# Patient Record
Sex: FEMALE | Race: WHITE | NOT HISPANIC OR LATINO | URBAN - METROPOLITAN AREA
[De-identification: names, ages, dates, MRNs, and addresses within clinical notes are randomized per-mention and may not be internally consistent; named-entity substitution may affect disease eponyms.]

---

## 2018-01-03 PROBLEM — 34801009 ECTOPIC PREGNANCY: Status: ACTIVE | Noted: 2018-01-03

## 2018-01-03 PROBLEM — 161701005 HISTORY OF RESPIRATOR DEPENDENCE: Status: ACTIVE | Noted: 2018-01-03

## 2018-01-03 PROBLEM — 38341003 HYPERTENSION: Status: ACTIVE | Noted: 2018-01-03

## 2018-01-03 PROBLEM — 70153002 HEMORRHOIDS: Status: ACTIVE | Noted: 2018-01-03

## 2018-01-03 PROBLEM — 3723001 ARTHRITIS: Status: ACTIVE | Noted: 2018-01-03

## 2019-11-12 PROBLEM — 4556007 GASTRITIS: Status: ACTIVE | Noted: 2019-11-12

## 2020-05-26 PROBLEM — 78275009 OBSTRUCTIVE SLEEP APNEA: Status: ACTIVE | Noted: 2020-05-26

## 2020-05-27 PROBLEM — 13644009 HYPERCHOLESTEROLEMIA: Status: ACTIVE | Noted: 2020-05-27

## 2020-05-27 PROBLEM — 203082005 FIBROMYALGIA: Status: ACTIVE | Noted: 2020-05-27

## 2020-05-27 PROBLEM — 428283002 HISTORY OF POLYP OF COLON: Status: ACTIVE | Noted: 2020-05-27

## 2020-05-27 PROBLEM — 197480006 ANXIETY DISORDER: Status: ACTIVE | Noted: 2020-05-27

## 2020-06-02 ENCOUNTER — OFFICE VISIT (OUTPATIENT)
Dept: URBAN - METROPOLITAN AREA CLINIC 13 | Facility: CLINIC | Age: 60
End: 2020-06-02

## 2020-06-24 ENCOUNTER — OFFICE VISIT (OUTPATIENT)
Dept: URBAN - METROPOLITAN AREA CLINIC 46 | Facility: CLINIC | Age: 60
End: 2020-06-24

## 2021-08-25 ENCOUNTER — OFFICE VISIT (OUTPATIENT)
Dept: URBAN - METROPOLITAN AREA CLINIC 46 | Facility: CLINIC | Age: 61
End: 2021-08-25

## 2021-08-27 ENCOUNTER — OFFICE VISIT (OUTPATIENT)
Dept: URBAN - METROPOLITAN AREA CLINIC 13 | Facility: CLINIC | Age: 61
End: 2021-08-27

## 2021-08-28 ENCOUNTER — TELEPHONE ENCOUNTER (OUTPATIENT)
Dept: URBAN - METROPOLITAN AREA CLINIC 13 | Facility: CLINIC | Age: 61
End: 2021-08-28

## 2021-08-28 RX ORDER — NALOXEGOL OXALATE 25 MG/1
TABLET, FILM COATED ORAL
OUTPATIENT
Start: 2019-11-12 | End: 2020-05-27

## 2021-08-28 RX ORDER — PSEUDOEPHEDRINE HYDROCHLORIDE 30 MG/1
TABLET, COATED ORAL
OUTPATIENT
End: 2019-06-18

## 2021-08-28 RX ORDER — BISOPROLOL FUMARATE 5 MG/1
TABLET, FILM COATED ORAL
OUTPATIENT
End: 2019-06-18

## 2021-08-28 RX ORDER — DEXLANSOPRAZOLE 60 MG/1
CAPSULE, DELAYED RELEASE ORAL
OUTPATIENT
Start: 2019-11-12 | End: 2020-05-27

## 2021-08-28 RX ORDER — VARENICLINE TARTRATE 1 MG/1
TABLET, FILM COATED ORAL
OUTPATIENT
End: 2019-06-18

## 2021-08-28 RX ORDER — DICYCLOMINE HYDROCHLORIDE 10 MG/1
CAPSULE ORAL
OUTPATIENT
Start: 2017-12-29 | End: 2019-03-11

## 2021-08-28 RX ORDER — ELETRIPTAN HYDROBROMIDE 40 MG/1
TABLET, FILM COATED ORAL
OUTPATIENT
End: 2019-11-12

## 2021-08-28 RX ORDER — HYOSCYAMINE SULFATE 0.12 MG/1
TABLET SUBLINGUAL
OUTPATIENT
Start: 2019-06-18 | End: 2019-09-10

## 2021-08-28 RX ORDER — ESOMEPRAZOLE MAGNESIUM 40 MG/1
GRANULE, DELAYED RELEASE ORAL
OUTPATIENT
End: 2020-05-27

## 2021-08-28 RX ORDER — CIPROFLOXACIN HYDROCHLORIDE 500 MG/1
TABLET, FILM COATED ORAL
OUTPATIENT
Start: 2017-12-29 | End: 2019-03-11

## 2021-08-28 RX ORDER — LINACLOTIDE 145 UG/1
CAPSULE, GELATIN COATED ORAL
OUTPATIENT
Start: 2017-10-31 | End: 2019-06-18

## 2021-08-28 RX ORDER — LINACLOTIDE 72 UG/1
CAPSULE, GELATIN COATED ORAL
OUTPATIENT
Start: 2019-03-11 | End: 2019-06-18

## 2021-08-28 RX ORDER — METRONIDAZOLE 500 MG/1
TABLET ORAL
OUTPATIENT
Start: 2017-12-29 | End: 2019-03-11

## 2021-08-28 RX ORDER — CYCLOSPORINE 0.5 MG/ML
EMULSION OPHTHALMIC
OUTPATIENT
End: 2019-09-10

## 2021-08-28 RX ORDER — SUCRALFATE 1 G/1
TABLET ORAL
OUTPATIENT
Start: 2019-06-18 | End: 2019-09-10

## 2021-08-29 ENCOUNTER — TELEPHONE ENCOUNTER (OUTPATIENT)
Dept: URBAN - METROPOLITAN AREA CLINIC 13 | Facility: CLINIC | Age: 61
End: 2021-08-29

## 2021-08-29 RX ORDER — ESOMEPRAZOLE MAGNESIUM 40 MG/1
FOR SUSPENSION ORAL
Status: ACTIVE | COMMUNITY
Start: 2020-03-23

## 2021-08-29 RX ORDER — RIZATRIPTAN BENZOATE 10 MG/1
TABLET ORAL
Status: ACTIVE | COMMUNITY

## 2021-08-29 RX ORDER — CYCLOBENZAPRINE HYDROCHLORIDE 10 MG/1
TABLET, FILM COATED ORAL
Status: ACTIVE | COMMUNITY

## 2021-08-29 RX ORDER — HYDROCODONE BITARTRATE AND ACETAMINOPHEN 5; 325 MG/1; MG/1
TABLET ORAL
Status: ACTIVE | COMMUNITY

## 2021-08-29 RX ORDER — ATENOLOL 25 MG/1
TABLET ORAL
Status: ACTIVE | COMMUNITY

## 2021-08-29 RX ORDER — BUPROPION HYDROCHLORIDE 300 MG/1
TABLET, EXTENDED RELEASE ORAL
Status: ACTIVE | COMMUNITY

## 2021-08-29 RX ORDER — TRAZODONE HYDROCHLORIDE 100 MG/1
TABLET ORAL
Status: ACTIVE | COMMUNITY

## 2021-08-29 RX ORDER — ADHESIVE TAPE 3"X 2.3 YD
TAPE, NON-MEDICATED TOPICAL
Status: ACTIVE | COMMUNITY

## 2021-08-29 RX ORDER — PANCRELIPASE LIPASE, PANCRELIPASE PROTEASE, PANCRELIPASE AMYLASE 40000; 126000; 168000 [USP'U]/1; [USP'U]/1; [USP'U]/1
CAPSULE, DELAYED RELEASE ORAL
Status: ACTIVE | COMMUNITY
Start: 2019-11-22

## 2021-08-29 RX ORDER — FLUTICASONE PROPIONATE 50 UG/1
SPRAY, METERED NASAL
Status: ACTIVE | COMMUNITY

## 2021-08-29 RX ORDER — HYDROCHLOROTHIAZIDE 12.5 MG/1
CAPSULE ORAL
Status: ACTIVE | COMMUNITY

## 2021-08-29 RX ORDER — DOCUSATE SODIUM 100 MG/1
TABLET ORAL
Status: ACTIVE | COMMUNITY

## 2021-08-29 RX ORDER — ONDANSETRON 8 MG/1
TABLET ORAL
Status: ACTIVE | COMMUNITY
Start: 2020-05-27

## 2021-08-29 RX ORDER — ASPIRIN 81 MG/1
TABLET, COATED ORAL
Status: ACTIVE | COMMUNITY

## 2021-08-29 RX ORDER — DOCUSATE SODIUM 50 MG AND SENNOSIDES 8.6 MG 8.6; 5 MG/1; MG/1
TABLET, FILM COATED ORAL
Status: ACTIVE | COMMUNITY

## 2021-08-29 RX ORDER — LINACLOTIDE 72 UG/1
CAPSULE, GELATIN COATED ORAL
Status: ACTIVE | COMMUNITY

## 2021-08-30 ENCOUNTER — LAB OUTSIDE AN ENCOUNTER (OUTPATIENT)
Dept: URBAN - METROPOLITAN AREA CLINIC 44 | Facility: CLINIC | Age: 61
End: 2021-08-30

## 2021-08-30 ENCOUNTER — OFFICE VISIT (OUTPATIENT)
Dept: URBAN - METROPOLITAN AREA CLINIC 44 | Facility: CLINIC | Age: 61
End: 2021-08-30
Payer: COMMERCIAL

## 2021-08-30 VITALS
BODY MASS INDEX: 41.23 KG/M2 | TEMPERATURE: 98 F | DIASTOLIC BLOOD PRESSURE: 59 MMHG | HEIGHT: 60 IN | SYSTOLIC BLOOD PRESSURE: 112 MMHG | WEIGHT: 210 LBS | HEART RATE: 76 BPM

## 2021-08-30 DIAGNOSIS — K57.50 DIVERTICULOSIS OF BOTH SMALL AND LARGE INTESTINE WITHOUT BLEEDING: ICD-10-CM

## 2021-08-30 DIAGNOSIS — R10.84 ABDOMINAL PAIN, GENERALIZED: ICD-10-CM

## 2021-08-30 DIAGNOSIS — R09.89 GLOBUS SENSATION: ICD-10-CM

## 2021-08-30 DIAGNOSIS — K21.9 GASTROESOPHAGEAL REFLUX DISEASE WITHOUT ESOPHAGITIS: ICD-10-CM

## 2021-08-30 PROBLEM — 398050005 DIVERTICULAR DISEASE OF COLON: Status: ACTIVE | Noted: 2018-01-03

## 2021-08-30 PROBLEM — 267103008: Status: ACTIVE | Noted: 2021-08-30

## 2021-08-30 PROCEDURE — 99214 OFFICE O/P EST MOD 30 MIN: CPT | Performed by: NURSE PRACTITIONER

## 2021-08-30 RX ORDER — DOCUSATE SODIUM 50 MG AND SENNOSIDES 8.6 MG 8.6; 5 MG/1; MG/1
TABLET, FILM COATED ORAL
Status: ACTIVE | COMMUNITY

## 2021-08-30 RX ORDER — DOCUSATE SODIUM 100 MG/1
TABLET ORAL
Status: ACTIVE | COMMUNITY

## 2021-08-30 RX ORDER — LINACLOTIDE 72 UG/1
CAPSULE, GELATIN COATED ORAL
Status: ACTIVE | COMMUNITY

## 2021-08-30 RX ORDER — TRAZODONE HYDROCHLORIDE 100 MG/1
TABLET ORAL
Status: ACTIVE | COMMUNITY

## 2021-08-30 RX ORDER — ATENOLOL 25 MG/1
TABLET ORAL
Status: ACTIVE | COMMUNITY

## 2021-08-30 RX ORDER — DICYCLOMINE HYDROCHLORIDE 10 MG/1
1 TABLET CAPSULE ORAL
Qty: 90 | Refills: 2 | OUTPATIENT
Start: 2021-08-30 | End: 2021-11-27

## 2021-08-30 RX ORDER — PANCRELIPASE LIPASE, PANCRELIPASE PROTEASE, PANCRELIPASE AMYLASE 40000; 126000; 168000 [USP'U]/1; [USP'U]/1; [USP'U]/1
CAPSULE, DELAYED RELEASE ORAL
Status: ACTIVE | COMMUNITY
Start: 2019-11-22

## 2021-08-30 RX ORDER — HYDROCHLOROTHIAZIDE 12.5 MG/1
CAPSULE ORAL
Status: ACTIVE | COMMUNITY

## 2021-08-30 RX ORDER — ADHESIVE TAPE 3"X 2.3 YD
TAPE, NON-MEDICATED TOPICAL
Status: ACTIVE | COMMUNITY

## 2021-08-30 RX ORDER — ONDANSETRON 8 MG/1
TABLET ORAL
Status: ACTIVE | COMMUNITY
Start: 2020-05-27

## 2021-08-30 RX ORDER — FLUTICASONE PROPIONATE 50 UG/1
SPRAY, METERED NASAL
Status: ACTIVE | COMMUNITY

## 2021-08-30 RX ORDER — BUPROPION HYDROCHLORIDE 300 MG/1
TABLET, EXTENDED RELEASE ORAL
Status: ACTIVE | COMMUNITY

## 2021-08-30 RX ORDER — ESOMEPRAZOLE MAGNESIUM 40 MG/1
FOR SUSPENSION ORAL
Status: ACTIVE | COMMUNITY
Start: 2020-03-23

## 2021-08-30 RX ORDER — ASPIRIN 81 MG/1
TABLET, COATED ORAL
Status: ACTIVE | COMMUNITY

## 2021-08-30 RX ORDER — RIZATRIPTAN BENZOATE 10 MG/1
TABLET ORAL
Status: ACTIVE | COMMUNITY

## 2021-08-30 RX ORDER — CYCLOBENZAPRINE HYDROCHLORIDE 10 MG/1
TABLET, FILM COATED ORAL
Status: ACTIVE | COMMUNITY

## 2021-08-30 RX ORDER — HYDROCODONE BITARTRATE AND ACETAMINOPHEN 5; 325 MG/1; MG/1
TABLET ORAL
Status: ACTIVE | COMMUNITY

## 2021-08-30 NOTE — PHYSICAL EXAM GASTROINTESTINAL
Abdomen , soft, milod LLQ and RLQ tenderness, nondistended , no guarding or rigidity , no masses palpable , normal bowel sounds , Liver and Spleen , no hepatomegaly present , no hepatosplenomegaly , liver nontender , spleen not palpable

## 2021-08-30 NOTE — HPI-ZZZ
61 year old presents for follow up.  The patient has a history of  GERD for which she has been on multiple PPI and is currently not under control with Nexium BID. She saw ENT recently and was referred back for complaints of globus sensation and the feeling of esophaeal narrowing.  ENT prescribed Famotidine daily but she did not start. Last year, she was diagnosed with asthma and was started and reports last PFTs were good. EGD and Bravo 5/2020 which was positive and the pateit saw Dr. Amato for consultation for lap nissen. She did not pursue due to COVID. In 2013, she had a partial colectomy due to diverticulitis. She was hospitalized in 2017 for diverticulitis. History of constipation and uses Senna, Aloe, and flax seed oil. Saturday, she had nausea and diarrhea, and lower abdominal pain. Pain has not improved. The patient desires further evaluation with surgery for a laparoscopic Nissen fundoplication to control her. Currently, she is still seeing the pain clinic for back and neck pain. Her last colon was in 2009 and showed diverticulosis.

## 2021-10-12 ENCOUNTER — CLAIMS CREATED FROM THE CLAIM WINDOW (OUTPATIENT)
Dept: URBAN - METROPOLITAN AREA CLINIC 4 | Facility: CLINIC | Age: 61
End: 2021-10-12
Payer: COMMERCIAL

## 2021-10-12 ENCOUNTER — OFFICE VISIT (OUTPATIENT)
Dept: URBAN - METROPOLITAN AREA SURGERY CENTER 27 | Facility: SURGERY CENTER | Age: 61
End: 2021-10-12
Payer: COMMERCIAL

## 2021-10-12 DIAGNOSIS — D12.3 BENIGN NEOPLASM OF TRANSVERSE COLON: ICD-10-CM

## 2021-10-12 DIAGNOSIS — R12 BURNING REFLUX: ICD-10-CM

## 2021-10-12 DIAGNOSIS — D12.2 BENIGN NEOPLASM OF ASCENDING COLON: ICD-10-CM

## 2021-10-12 DIAGNOSIS — D12.4 ADENOMA OF DESCENDING COLON: ICD-10-CM

## 2021-10-12 DIAGNOSIS — D12.4 BENIGN NEOPLASM OF DESCENDING COLON: ICD-10-CM

## 2021-10-12 DIAGNOSIS — K44.9 DIAPHRAGMATIC HERNIA: ICD-10-CM

## 2021-10-12 DIAGNOSIS — D12.2 ADENOMA OF ASCENDING COLON: ICD-10-CM

## 2021-10-12 DIAGNOSIS — R10.32 ABDOMINAL CRAMPING IN LEFT LOWER QUADRANT: ICD-10-CM

## 2021-10-12 DIAGNOSIS — D12.3 ADENOMA OF TRANSVERSE COLON: ICD-10-CM

## 2021-10-12 DIAGNOSIS — R13.10 ABNORMAL DEGLUTITION: ICD-10-CM

## 2021-10-12 PROCEDURE — 88305 TISSUE EXAM BY PATHOLOGIST: CPT | Performed by: PATHOLOGY

## 2021-10-12 PROCEDURE — 43248 EGD GUIDE WIRE INSERTION: CPT | Performed by: INTERNAL MEDICINE

## 2021-10-12 PROCEDURE — G8907 PT DOC NO EVENTS ON DISCHARG: HCPCS | Performed by: INTERNAL MEDICINE

## 2021-10-12 PROCEDURE — 45385 COLONOSCOPY W/LESION REMOVAL: CPT | Performed by: INTERNAL MEDICINE

## 2022-02-03 ENCOUNTER — OFFICE VISIT (OUTPATIENT)
Dept: URBAN - METROPOLITAN AREA CLINIC 48 | Facility: CLINIC | Age: 62
End: 2022-02-03
Payer: COMMERCIAL

## 2022-02-03 VITALS
DIASTOLIC BLOOD PRESSURE: 76 MMHG | HEART RATE: 88 BPM | BODY MASS INDEX: 42.6 KG/M2 | TEMPERATURE: 97.7 F | HEIGHT: 60 IN | WEIGHT: 217 LBS | SYSTOLIC BLOOD PRESSURE: 126 MMHG

## 2022-02-03 DIAGNOSIS — K22.4 ESOPHAGEAL SPASM: ICD-10-CM

## 2022-02-03 DIAGNOSIS — K21.9: ICD-10-CM

## 2022-02-03 DIAGNOSIS — R68.81 EARLY SATIETY: ICD-10-CM

## 2022-02-03 DIAGNOSIS — R14.0 ABDOMINAL BLOATING: ICD-10-CM

## 2022-02-03 DIAGNOSIS — R10.13 EPIGASTRIC PAIN: ICD-10-CM

## 2022-02-03 DIAGNOSIS — K59.09: ICD-10-CM

## 2022-02-03 DIAGNOSIS — K44.9 HIATAL HERNIA: ICD-10-CM

## 2022-02-03 PROBLEM — 14760008 CONSTIPATION: Status: ACTIVE | Noted: 2022-02-03

## 2022-02-03 PROBLEM — 235595009 GASTROESOPHAGEAL REFLUX DISEASE: Status: ACTIVE | Noted: 2022-02-03

## 2022-02-03 PROBLEM — 442076002: Status: ACTIVE | Noted: 2022-02-03

## 2022-02-03 PROCEDURE — 99214 OFFICE O/P EST MOD 30 MIN: CPT | Performed by: NURSE PRACTITIONER

## 2022-02-03 RX ORDER — HYDROCODONE BITARTRATE AND ACETAMINOPHEN 5; 325 MG/1; MG/1
TABLET ORAL
Status: ACTIVE | COMMUNITY

## 2022-02-03 RX ORDER — DOCUSATE SODIUM 50 MG AND SENNOSIDES 8.6 MG 8.6; 5 MG/1; MG/1
TABLET, FILM COATED ORAL
Status: ACTIVE | COMMUNITY

## 2022-02-03 RX ORDER — FAMOTIDINE 40 MG/1
1 TABLET AT BEDTIME TABLET, FILM COATED ORAL TWICE A DAY
Qty: 60 | Refills: 2 | OUTPATIENT

## 2022-02-03 RX ORDER — HYDROCHLOROTHIAZIDE 12.5 MG/1
CAPSULE ORAL
Status: ACTIVE | COMMUNITY

## 2022-02-03 RX ORDER — BUPROPION HYDROCHLORIDE 300 MG/1
TABLET, EXTENDED RELEASE ORAL
Status: ACTIVE | COMMUNITY

## 2022-02-03 RX ORDER — ONDANSETRON 8 MG/1
TABLET ORAL
Status: ACTIVE | COMMUNITY
Start: 2020-05-27

## 2022-02-03 RX ORDER — LINACLOTIDE 72 UG/1
CAPSULE, GELATIN COATED ORAL
Status: ACTIVE | COMMUNITY

## 2022-02-03 RX ORDER — ESOMEPRAZOLE MAGNESIUM 40 MG/1
FOR SUSPENSION ORAL
Status: ACTIVE | COMMUNITY
Start: 2020-03-23

## 2022-02-03 RX ORDER — DOCUSATE SODIUM 100 MG/1
TABLET ORAL
Status: ACTIVE | COMMUNITY

## 2022-02-03 RX ORDER — CYCLOBENZAPRINE HYDROCHLORIDE 10 MG/1
TABLET, FILM COATED ORAL
Status: ACTIVE | COMMUNITY

## 2022-02-03 RX ORDER — RIZATRIPTAN BENZOATE 10 MG/1
TABLET ORAL
Status: ACTIVE | COMMUNITY

## 2022-02-03 RX ORDER — PLECANATIDE 3 MG/1
1 TABLET TABLET ORAL ONCE A DAY
Qty: 30 | Refills: 3 | OUTPATIENT

## 2022-02-03 RX ORDER — TRAZODONE HYDROCHLORIDE 100 MG/1
TABLET ORAL
Status: ACTIVE | COMMUNITY

## 2022-02-03 RX ORDER — ADHESIVE TAPE 3"X 2.3 YD
TAPE, NON-MEDICATED TOPICAL
Status: ACTIVE | COMMUNITY

## 2022-02-03 RX ORDER — SUCRALFATE 1 G/1
1 TABLET ON AN EMPTY STOMACH TABLET ORAL TWICE A DAY
Qty: 28 TABLET | Refills: 0 | OUTPATIENT

## 2022-02-03 RX ORDER — PANCRELIPASE LIPASE, PANCRELIPASE PROTEASE, PANCRELIPASE AMYLASE 40000; 126000; 168000 [USP'U]/1; [USP'U]/1; [USP'U]/1
CAPSULE, DELAYED RELEASE ORAL
Status: ACTIVE | COMMUNITY
Start: 2019-11-22

## 2022-02-03 RX ORDER — DICYCLOMINE HYDROCHLORIDE 10 MG/1
1 TABLET CAPSULE ORAL THREE TIMES A DAY
Qty: 90 | Refills: 1 | OUTPATIENT

## 2022-02-03 RX ORDER — ASPIRIN 81 MG/1
TABLET, COATED ORAL
Status: ACTIVE | COMMUNITY

## 2022-02-03 RX ORDER — FLUTICASONE PROPIONATE 50 UG/1
SPRAY, METERED NASAL
Status: ACTIVE | COMMUNITY

## 2022-02-03 RX ORDER — ATENOLOL 25 MG/1
TABLET ORAL
Status: ACTIVE | COMMUNITY

## 2022-02-03 NOTE — HPI-TODAY'S VISIT:
61 year old presents for follow up. The patient had COVID in January. She continues to have hoarseness and feels like she is having heart racing related to her GI system. In March, she is to see PCP for an annual physical. The patient has nausea daily along with bloating and distention. Esophageal spasms are occurring intermittently. She is following up with her cardiologist in a few weeks. Last EGD/colon in 10/2021 and showed a hiatal hernia, tubular adenoma, and internal hemorrhoids. ENT scheduled Famotidine 40mg prn. She feels Nexium is no longer working. The patient was on antibiotics x2 in the last 2 months. Asthma and sleep apnea is worsening and she may have to start Bi-pap, but currently on C-Pap. EGD and Bravo 5/2020 which was positive and the patient saw Dr. Amato for consultation for lap nissen. In 2013, she had a partial colectomy due to diverticulitis. She was hospitalized in 2017 for diverticulitis. She has a history of constipation and used to use Senna, Aloe, and flax seed oil.  Linzess caused diarrhea.  Currently, she is still seeing the pain clinic for back and neck pain.

## 2022-02-03 NOTE — PHYSICAL EXAM CONSTITUTIONAL:
well developed, well nourished , obese, in no acute distress , ambulating without difficulty , normal communication ability

## 2022-02-07 ENCOUNTER — WEB ENCOUNTER (OUTPATIENT)
Dept: URBAN - METROPOLITAN AREA CLINIC 46 | Facility: CLINIC | Age: 62
End: 2022-02-07

## 2022-02-10 ENCOUNTER — TELEPHONE ENCOUNTER (OUTPATIENT)
Dept: URBAN - METROPOLITAN AREA CLINIC 44 | Facility: CLINIC | Age: 62
End: 2022-02-10

## 2022-02-10 RX ORDER — PLECANATIDE 3 MG/1
1 TABLET TABLET ORAL ONCE A DAY
Qty: 30 | Refills: 3 | Status: ACTIVE | COMMUNITY

## 2022-02-10 RX ORDER — BUPROPION HYDROCHLORIDE 300 MG/1
TABLET, EXTENDED RELEASE ORAL
Status: ACTIVE | COMMUNITY

## 2022-02-10 RX ORDER — RIZATRIPTAN BENZOATE 10 MG/1
TABLET ORAL
Status: ACTIVE | COMMUNITY

## 2022-02-10 RX ORDER — ONDANSETRON 8 MG/1
TABLET ORAL
Status: ACTIVE | COMMUNITY
Start: 2020-05-27

## 2022-02-10 RX ORDER — LINACLOTIDE 72 UG/1
CAPSULE, GELATIN COATED ORAL
Status: ACTIVE | COMMUNITY

## 2022-02-10 RX ORDER — SUCRALFATE 1 G/1
1 TABLET ON AN EMPTY STOMACH TABLET ORAL TWICE A DAY
Qty: 28 TABLET | Refills: 0 | Status: ACTIVE | COMMUNITY

## 2022-02-10 RX ORDER — DICYCLOMINE HYDROCHLORIDE 10 MG/1
1 TABLET CAPSULE ORAL THREE TIMES A DAY
Qty: 90 | Refills: 1 | Status: ACTIVE | COMMUNITY

## 2022-02-10 RX ORDER — FAMOTIDINE 40 MG/1
1 TABLET AT BEDTIME TABLET, FILM COATED ORAL TWICE A DAY
Qty: 60 | Refills: 2 | Status: ACTIVE | COMMUNITY

## 2022-02-10 RX ORDER — DOCUSATE SODIUM 100 MG/1
TABLET ORAL
Status: ACTIVE | COMMUNITY

## 2022-02-10 RX ORDER — ESOMEPRAZOLE MAGNESIUM 40 MG/1
FOR SUSPENSION ORAL
Status: ACTIVE | COMMUNITY
Start: 2020-03-23

## 2022-02-10 RX ORDER — FAMOTIDINE 40 MG/1
1 TABLET AS NEEDED FOR REFLUX TABLET, FILM COATED ORAL TWICE A DAY
Qty: 60 TABLET | Refills: 3 | OUTPATIENT

## 2022-02-10 RX ORDER — PANCRELIPASE LIPASE, PANCRELIPASE PROTEASE, PANCRELIPASE AMYLASE 40000; 126000; 168000 [USP'U]/1; [USP'U]/1; [USP'U]/1
CAPSULE, DELAYED RELEASE ORAL
Status: ACTIVE | COMMUNITY
Start: 2019-11-22

## 2022-02-10 RX ORDER — TRAZODONE HYDROCHLORIDE 100 MG/1
TABLET ORAL
Status: ACTIVE | COMMUNITY

## 2022-02-10 RX ORDER — ATENOLOL 25 MG/1
TABLET ORAL
Status: ACTIVE | COMMUNITY

## 2022-02-10 RX ORDER — FLUTICASONE PROPIONATE 50 UG/1
SPRAY, METERED NASAL
Status: ACTIVE | COMMUNITY

## 2022-02-10 RX ORDER — ADHESIVE TAPE 3"X 2.3 YD
TAPE, NON-MEDICATED TOPICAL
Status: ACTIVE | COMMUNITY

## 2022-02-10 RX ORDER — HYDROCODONE BITARTRATE AND ACETAMINOPHEN 5; 325 MG/1; MG/1
TABLET ORAL
Status: ACTIVE | COMMUNITY

## 2022-02-10 RX ORDER — CYCLOBENZAPRINE HYDROCHLORIDE 10 MG/1
TABLET, FILM COATED ORAL
Status: ACTIVE | COMMUNITY

## 2022-02-10 RX ORDER — HYDROCHLOROTHIAZIDE 12.5 MG/1
CAPSULE ORAL
Status: ACTIVE | COMMUNITY

## 2022-02-10 RX ORDER — ASPIRIN 81 MG/1
TABLET, COATED ORAL
Status: ACTIVE | COMMUNITY

## 2022-02-10 RX ORDER — DOCUSATE SODIUM 50 MG AND SENNOSIDES 8.6 MG 8.6; 5 MG/1; MG/1
TABLET, FILM COATED ORAL
Status: ACTIVE | COMMUNITY

## 2022-02-14 ENCOUNTER — WEB ENCOUNTER (OUTPATIENT)
Dept: URBAN - METROPOLITAN AREA CLINIC 46 | Facility: CLINIC | Age: 62
End: 2022-02-14

## 2022-02-14 RX ORDER — ASPIRIN 81 MG/1
TABLET, COATED ORAL
Status: ACTIVE | COMMUNITY

## 2022-02-14 RX ORDER — DOCUSATE SODIUM 50 MG AND SENNOSIDES 8.6 MG 8.6; 5 MG/1; MG/1
TABLET, FILM COATED ORAL
Status: ACTIVE | COMMUNITY

## 2022-02-14 RX ORDER — DOCUSATE SODIUM 100 MG/1
TABLET ORAL
Status: ACTIVE | COMMUNITY

## 2022-02-14 RX ORDER — ADHESIVE TAPE 3"X 2.3 YD
TAPE, NON-MEDICATED TOPICAL
Status: ACTIVE | COMMUNITY

## 2022-02-14 RX ORDER — CYCLOBENZAPRINE HYDROCHLORIDE 10 MG/1
TABLET, FILM COATED ORAL
Status: ACTIVE | COMMUNITY

## 2022-02-14 RX ORDER — BUPROPION HYDROCHLORIDE 300 MG/1
TABLET, EXTENDED RELEASE ORAL
Status: ACTIVE | COMMUNITY

## 2022-02-14 RX ORDER — FLUTICASONE PROPIONATE 50 UG/1
SPRAY, METERED NASAL
Status: ACTIVE | COMMUNITY

## 2022-02-14 RX ORDER — LINACLOTIDE 72 UG/1
CAPSULE, GELATIN COATED ORAL
Status: ACTIVE | COMMUNITY

## 2022-02-14 RX ORDER — HYDROCHLOROTHIAZIDE 12.5 MG/1
CAPSULE ORAL
Status: ACTIVE | COMMUNITY

## 2022-02-14 RX ORDER — ONDANSETRON 8 MG/1
TABLET ORAL
Status: ACTIVE | COMMUNITY
Start: 2020-05-27

## 2022-02-14 RX ORDER — FAMOTIDINE 40 MG/1
1 TABLET AS NEEDED FOR REFLUX TABLET, FILM COATED ORAL TWICE A DAY
Qty: 60 TABLET | Refills: 3 | Status: ACTIVE | COMMUNITY

## 2022-02-14 RX ORDER — RIZATRIPTAN BENZOATE 10 MG/1
TABLET ORAL
Status: ACTIVE | COMMUNITY

## 2022-02-14 RX ORDER — HYDROCODONE BITARTRATE AND ACETAMINOPHEN 5; 325 MG/1; MG/1
TABLET ORAL
Status: ACTIVE | COMMUNITY

## 2022-02-14 RX ORDER — FAMOTIDINE 40 MG/1
1 TABLET AT BEDTIME TABLET, FILM COATED ORAL TWICE A DAY
Qty: 60 | Refills: 2 | Status: ACTIVE | COMMUNITY

## 2022-02-14 RX ORDER — ESOMEPRAZOLE MAGNESIUM 40 MG/1
FOR SUSPENSION ORAL
Status: ACTIVE | COMMUNITY
Start: 2020-03-23

## 2022-02-14 RX ORDER — PLECANATIDE 3 MG/1
1 TABLET TABLET ORAL ONCE A DAY
Qty: 30 | Refills: 3 | Status: ACTIVE | COMMUNITY

## 2022-02-14 RX ORDER — TRAZODONE HYDROCHLORIDE 100 MG/1
TABLET ORAL
Status: ACTIVE | COMMUNITY

## 2022-02-14 RX ORDER — SUCRALFATE 1 G/1
1 TABLET ON AN EMPTY STOMACH TABLET ORAL TWICE A DAY
Qty: 28 TABLET | Refills: 0 | Status: ACTIVE | COMMUNITY

## 2022-02-14 RX ORDER — ATENOLOL 25 MG/1
TABLET ORAL
Status: ACTIVE | COMMUNITY

## 2022-02-14 RX ORDER — SULFAMETHOXAZOLE AND TRIMETHOPRIM 800; 160 MG/1; MG/1
1 TABLET TABLET ORAL TWICE A DAY
Qty: 20 | OUTPATIENT
Start: 2022-02-14 | End: 2022-02-24

## 2022-02-14 RX ORDER — DICYCLOMINE HYDROCHLORIDE 10 MG/1
1 TABLET CAPSULE ORAL THREE TIMES A DAY
Qty: 90 | Refills: 1 | Status: ACTIVE | COMMUNITY

## 2022-02-14 RX ORDER — PANCRELIPASE LIPASE, PANCRELIPASE PROTEASE, PANCRELIPASE AMYLASE 40000; 126000; 168000 [USP'U]/1; [USP'U]/1; [USP'U]/1
CAPSULE, DELAYED RELEASE ORAL
Status: ACTIVE | COMMUNITY
Start: 2019-11-22

## 2022-02-18 ENCOUNTER — WEB ENCOUNTER (OUTPATIENT)
Dept: URBAN - METROPOLITAN AREA CLINIC 46 | Facility: CLINIC | Age: 62
End: 2022-02-18

## 2022-02-18 RX ORDER — BUPROPION HYDROCHLORIDE 300 MG/1
TABLET, EXTENDED RELEASE ORAL
Status: ACTIVE | COMMUNITY

## 2022-02-18 RX ORDER — FLUTICASONE PROPIONATE 50 UG/1
SPRAY, METERED NASAL
Status: ACTIVE | COMMUNITY

## 2022-02-18 RX ORDER — PLECANATIDE 3 MG/1
1 TABLET TABLET ORAL ONCE A DAY
Qty: 30 | Refills: 3 | Status: ACTIVE | COMMUNITY

## 2022-02-18 RX ORDER — HYDROCHLOROTHIAZIDE 12.5 MG/1
CAPSULE ORAL
Status: ACTIVE | COMMUNITY

## 2022-02-18 RX ORDER — SUCRALFATE 1 G/1
1 TABLET ON AN EMPTY STOMACH TABLET ORAL TWICE A DAY
Qty: 28 TABLET | Refills: 0 | Status: ACTIVE | COMMUNITY

## 2022-02-18 RX ORDER — PANCRELIPASE LIPASE, PANCRELIPASE PROTEASE, PANCRELIPASE AMYLASE 40000; 126000; 168000 [USP'U]/1; [USP'U]/1; [USP'U]/1
CAPSULE, DELAYED RELEASE ORAL
Status: ACTIVE | COMMUNITY
Start: 2019-11-22

## 2022-02-18 RX ORDER — SULFAMETHOXAZOLE AND TRIMETHOPRIM 800; 160 MG/1; MG/1
1 TABLET TABLET ORAL TWICE A DAY
Qty: 20 | Status: ACTIVE | COMMUNITY
Start: 2022-02-14 | End: 2022-02-24

## 2022-02-18 RX ORDER — RIZATRIPTAN BENZOATE 10 MG/1
TABLET ORAL
Status: ACTIVE | COMMUNITY

## 2022-02-18 RX ORDER — HYDROCODONE BITARTRATE AND ACETAMINOPHEN 5; 325 MG/1; MG/1
TABLET ORAL
Status: ACTIVE | COMMUNITY

## 2022-02-18 RX ORDER — TRAZODONE HYDROCHLORIDE 100 MG/1
TABLET ORAL
Status: ACTIVE | COMMUNITY

## 2022-02-18 RX ORDER — DICYCLOMINE HYDROCHLORIDE 10 MG/1
1 TABLET CAPSULE ORAL THREE TIMES A DAY
Qty: 90 | Refills: 1 | Status: ACTIVE | COMMUNITY

## 2022-02-18 RX ORDER — LINACLOTIDE 72 UG/1
CAPSULE, GELATIN COATED ORAL
Status: ACTIVE | COMMUNITY

## 2022-02-18 RX ORDER — ATENOLOL 25 MG/1
TABLET ORAL
Status: ACTIVE | COMMUNITY

## 2022-02-18 RX ORDER — DOCUSATE SODIUM 50 MG AND SENNOSIDES 8.6 MG 8.6; 5 MG/1; MG/1
TABLET, FILM COATED ORAL
Status: ACTIVE | COMMUNITY

## 2022-02-18 RX ORDER — FAMOTIDINE 40 MG/1
1 TABLET AS NEEDED FOR REFLUX TABLET, FILM COATED ORAL TWICE A DAY
Qty: 60 TABLET | Refills: 3 | Status: ACTIVE | COMMUNITY

## 2022-02-18 RX ORDER — ADHESIVE TAPE 3"X 2.3 YD
TAPE, NON-MEDICATED TOPICAL
Status: ACTIVE | COMMUNITY

## 2022-02-18 RX ORDER — CYCLOBENZAPRINE HYDROCHLORIDE 10 MG/1
TABLET, FILM COATED ORAL
Status: ACTIVE | COMMUNITY

## 2022-02-18 RX ORDER — ASPIRIN 81 MG/1
TABLET, COATED ORAL
Status: ACTIVE | COMMUNITY

## 2022-02-18 RX ORDER — DOCUSATE SODIUM 100 MG/1
TABLET ORAL
Status: ACTIVE | COMMUNITY

## 2022-02-18 RX ORDER — ESOMEPRAZOLE MAGNESIUM 40 MG/1
FOR SUSPENSION ORAL
Status: ACTIVE | COMMUNITY
Start: 2020-03-23

## 2022-02-18 RX ORDER — ONDANSETRON 8 MG/1
TABLET ORAL
Status: ACTIVE | COMMUNITY
Start: 2020-05-27

## 2022-02-18 RX ORDER — DEXLANSOPRAZOLE 60 MG/1
1 CAPSULE CAPSULE, DELAYED RELEASE ORAL ONCE A DAY
Qty: 30 | Refills: 4 | OUTPATIENT

## 2022-02-18 RX ORDER — CIPROFLOXACIN HYDROCHLORIDE 250 MG/1
1 TABLET TABLET, FILM COATED ORAL
Qty: 20 TABLET | Refills: 0 | OUTPATIENT

## 2022-02-18 RX ORDER — FAMOTIDINE 40 MG/1
1 TABLET AT BEDTIME TABLET, FILM COATED ORAL TWICE A DAY
Qty: 60 | Refills: 2 | Status: ACTIVE | COMMUNITY

## 2022-02-21 PROBLEM — 54586004: Status: ACTIVE | Noted: 2021-08-30

## 2022-02-21 PROBLEM — 79922009: Status: ACTIVE | Noted: 2022-02-03

## 2022-02-21 PROBLEM — 422587007 NAUSEA: Status: ACTIVE | Noted: 2022-02-21

## 2022-03-03 ENCOUNTER — OFFICE VISIT (OUTPATIENT)
Dept: URBAN - METROPOLITAN AREA CLINIC 48 | Facility: CLINIC | Age: 62
End: 2022-03-03
Payer: COMMERCIAL

## 2022-03-03 ENCOUNTER — ERX REFILL RESPONSE (OUTPATIENT)
Dept: URBAN - METROPOLITAN AREA CLINIC 44 | Facility: CLINIC | Age: 62
End: 2022-03-03

## 2022-03-03 VITALS
TEMPERATURE: 97.2 F | SYSTOLIC BLOOD PRESSURE: 117 MMHG | HEIGHT: 60 IN | DIASTOLIC BLOOD PRESSURE: 73 MMHG | HEART RATE: 60 BPM | WEIGHT: 209.2 LBS | BODY MASS INDEX: 41.07 KG/M2

## 2022-03-03 DIAGNOSIS — R14.0 ABDOMINAL BLOATING: ICD-10-CM

## 2022-03-03 DIAGNOSIS — K21.9 GASTROESOPHAGEAL REFLUX DISEASE WITHOUT ESOPHAGITIS: ICD-10-CM

## 2022-03-03 DIAGNOSIS — K44.9 HIATAL HERNIA: ICD-10-CM

## 2022-03-03 DIAGNOSIS — R11.0 NAUSEA: ICD-10-CM

## 2022-03-03 PROBLEM — 266434009: Status: ACTIVE | Noted: 2022-02-03

## 2022-03-03 PROCEDURE — 99214 OFFICE O/P EST MOD 30 MIN: CPT | Performed by: NURSE PRACTITIONER

## 2022-03-03 RX ORDER — FAMOTIDINE 40 MG/1
1 TABLET AT BEDTIME TABLET, FILM COATED ORAL TWICE A DAY
Qty: 60 | Refills: 2 | Status: ACTIVE | COMMUNITY

## 2022-03-03 RX ORDER — RIFAXIMIN 550 MG/1
1 TABLET TABLET ORAL THREE TIMES A DAY
Qty: 42 TABLET | Refills: 2 | OUTPATIENT

## 2022-03-03 RX ORDER — DOCUSATE SODIUM 50 MG AND SENNOSIDES 8.6 MG 8.6; 5 MG/1; MG/1
1 TABLET IN THE EVENING AS NEEDED TABLET, FILM COATED ORAL ONCE A DAY
Status: ACTIVE | COMMUNITY

## 2022-03-03 RX ORDER — TRAZODONE HYDROCHLORIDE 100 MG/1
1 TABLET AT BEDTIME TABLET ORAL ONCE A DAY
Status: ACTIVE | COMMUNITY

## 2022-03-03 RX ORDER — CYCLOBENZAPRINE HYDROCHLORIDE 10 MG/1
1 TABLET AT BEDTIME AS NEEDED TABLET, FILM COATED ORAL ONCE A DAY
Status: ACTIVE | COMMUNITY

## 2022-03-03 RX ORDER — LINACLOTIDE 145 UG/1
1 CAPSULE AT LEAST 30 MINUTES BEFORE THE FIRST MEAL OF THE DAY ON AN EMPTY STOMACH CAPSULE, GELATIN COATED ORAL ONCE A DAY
Status: ACTIVE | COMMUNITY

## 2022-03-03 RX ORDER — BUPROPION HYDROCHLORIDE 300 MG/1
1 TABLET IN THE MORNING TABLET, EXTENDED RELEASE ORAL ONCE A DAY
Status: ACTIVE | COMMUNITY

## 2022-03-03 RX ORDER — SUCRALFATE 1 G/1
1 TABLET ON AN EMPTY STOMACH TABLET ORAL TWICE A DAY
Qty: 28 TABLET | Refills: 0 | Status: DISCONTINUED | COMMUNITY

## 2022-03-03 RX ORDER — FAMOTIDINE 40 MG/1
1 TABLET AS NEEDED FOR REFLUX TABLET, FILM COATED ORAL TWICE A DAY
Qty: 60 TABLET | Refills: 3 | Status: DISCONTINUED | COMMUNITY

## 2022-03-03 RX ORDER — FLUTICASONE PROPIONATE 50 UG/1
1 SPRAY IN EACH NOSTRIL SPRAY, METERED NASAL ONCE A DAY
Status: ACTIVE | COMMUNITY

## 2022-03-03 RX ORDER — PANCRELIPASE LIPASE, PANCRELIPASE PROTEASE, PANCRELIPASE AMYLASE 40000; 126000; 168000 [USP'U]/1; [USP'U]/1; [USP'U]/1
CAPSULE, DELAYED RELEASE ORAL
Status: DISCONTINUED | COMMUNITY
Start: 2019-11-22

## 2022-03-03 RX ORDER — HYDROCHLOROTHIAZIDE 12.5 MG/1
1 TABLET IN THE MORNING CAPSULE ORAL ONCE A DAY
Status: ACTIVE | COMMUNITY

## 2022-03-03 RX ORDER — DEXLANSOPRAZOLE 60 MG/1
1 CAPSULE CAPSULE, DELAYED RELEASE ORAL ONCE A DAY
Qty: 30 | Refills: 6 | OUTPATIENT

## 2022-03-03 RX ORDER — ESOMEPRAZOLE MAGNESIUM 40 MG/1
1 CAPSULE CAPSULE, DELAYED RELEASE ORAL ONCE A DAY
Qty: 90 | Refills: 3 | OUTPATIENT

## 2022-03-03 RX ORDER — TITANIUM DIOXIDE, OCTINOXATE, ZINC OXIDE 4.61; 1.6; .78 G/40ML; G/40ML; G/40ML
1 CAPSULE CREAM TOPICAL ONCE A DAY
Status: ACTIVE | COMMUNITY

## 2022-03-03 RX ORDER — RIZATRIPTAN BENZOATE 10 MG/1
1 TABLET AS NEEDED TABLET ORAL ONCE A DAY
Status: ACTIVE | COMMUNITY

## 2022-03-03 RX ORDER — DEXLANSOPRAZOLE 60 MG/1
1 CAPSULE CAPSULE, DELAYED RELEASE ORAL ONCE A DAY
Qty: 30 | Refills: 4 | Status: DISCONTINUED | COMMUNITY

## 2022-03-03 RX ORDER — ATENOLOL 25 MG/1
TABLET ORAL
Status: DISCONTINUED | COMMUNITY

## 2022-03-03 RX ORDER — ASPIRIN 81 MG/1
1 TABLET TABLET, COATED ORAL ONCE A DAY
Status: ACTIVE | COMMUNITY

## 2022-03-03 RX ORDER — PLECANATIDE 3 MG/1
1 TABLET TABLET ORAL ONCE A DAY
Qty: 30 | Refills: 3 | Status: ACTIVE | COMMUNITY

## 2022-03-03 RX ORDER — ESOMEPRAZOLE MAGNESIUM 40 MG/1
1 CAPSULE CAPSULE, DELAYED RELEASE ORAL TWICE A DAY
Status: ACTIVE | COMMUNITY
Start: 2020-03-23

## 2022-03-03 RX ORDER — CIPROFLOXACIN HYDROCHLORIDE 250 MG/1
1 TABLET TABLET, FILM COATED ORAL
Qty: 20 TABLET | Refills: 0 | Status: DISCONTINUED | COMMUNITY

## 2022-03-03 RX ORDER — DICYCLOMINE HYDROCHLORIDE 10 MG/1
1 TABLET CAPSULE ORAL THREE TIMES A DAY
Qty: 90 | Refills: 1 | Status: DISCONTINUED | COMMUNITY

## 2022-03-03 RX ORDER — HYDROCODONE BITARTRATE AND ACETAMINOPHEN 5; 325 MG/1; MG/1
1 TABLET AS NEEDED TABLET ORAL
Status: ACTIVE | COMMUNITY

## 2022-03-03 RX ORDER — DOCUSATE SODIUM 100 MG/1
TABLET ORAL
Status: DISCONTINUED | COMMUNITY

## 2022-03-03 RX ORDER — CELECOXIB 100 MG/1
1 CAPSULE WITH FOOD CAPSULE ORAL ONCE A DAY
Status: ACTIVE | COMMUNITY

## 2022-03-03 RX ORDER — ONDANSETRON HYDROCHLORIDE 4 MG/1
1 TABLET AS NEEDED TABLET, FILM COATED ORAL ONCE A DAY
Status: ACTIVE | COMMUNITY
Start: 2020-05-27

## 2022-03-03 RX ORDER — BUSPIRONE HYDROCHLORIDE 10 MG/1
1 TABLET TABLET ORAL TWICE A DAY
Status: ACTIVE | COMMUNITY

## 2022-03-03 NOTE — HPI-TODAY'S VISIT:
61 year old presents for follow up. Last seeen for increased hoarseness and feeling like her heart was racing related to her GI symptoms. The patient feels a lot of her symptoms are related to eating beef. She is also having nausea, esophageal spasms, abdominal bloating for which she was given Xifaxan samples. After starting Famotidine and stopping Nexium, GERD has not improved. Epigastric pain is improved since finishing a course of Sucralfate. Nausea continues but stopping eating beef has helped overall. Xifaxan samples helped her feel much better but symptoms returned after she finished. She has a history of constipation and used to use Senna, Aloe, and flax seed oil.  Linzess caused diarrhea.   Trulance did not help.   Last EGD/colon in 10/2021 and showed a hiatal hernia, tubular adenoma, and internal hemorrhoids. ENT scheduled Famotidine 40mg prn. She feels Nexium is no longer working. The patient was on antibiotics x2 in the last 2 months. Asthma and sleep apnea is worsening and she may have to start Bi-pap, but currently on C-Pap. EGD and Bravo 5/2020 which was positive and the patient saw Dr. Amato for consultation for nini nissen. In 2013, she had a partial colectomy due to diverticulitis.

## 2022-03-15 ENCOUNTER — WEB ENCOUNTER (OUTPATIENT)
Dept: URBAN - METROPOLITAN AREA CLINIC 46 | Facility: CLINIC | Age: 62
End: 2022-03-15

## 2022-03-18 ENCOUNTER — WEB ENCOUNTER (OUTPATIENT)
Dept: URBAN - METROPOLITAN AREA CLINIC 46 | Facility: CLINIC | Age: 62
End: 2022-03-18

## 2022-03-25 ENCOUNTER — TELEPHONE ENCOUNTER (OUTPATIENT)
Dept: URBAN - METROPOLITAN AREA CLINIC 46 | Facility: CLINIC | Age: 62
End: 2022-03-25

## 2022-03-25 RX ORDER — SULFAMETHOXAZOLE AND TRIMETHOPRIM 800; 160 MG/1; MG/1
1 TABLET TABLET ORAL TWICE A DAY
Qty: 20 | OUTPATIENT
Start: 2022-03-28 | End: 2022-04-07

## 2022-04-04 ENCOUNTER — OFFICE VISIT (OUTPATIENT)
Dept: URBAN - METROPOLITAN AREA CLINIC 48 | Facility: CLINIC | Age: 62
End: 2022-04-04
Payer: COMMERCIAL

## 2022-04-04 VITALS
TEMPERATURE: 97.9 F | SYSTOLIC BLOOD PRESSURE: 128 MMHG | HEIGHT: 60 IN | DIASTOLIC BLOOD PRESSURE: 66 MMHG | HEART RATE: 82 BPM | BODY MASS INDEX: 39.82 KG/M2 | WEIGHT: 202.8 LBS

## 2022-04-04 DIAGNOSIS — R10.84 GENERALIZED ABDOMINAL PAIN: ICD-10-CM

## 2022-04-04 DIAGNOSIS — K44.9 HIATAL HERNIA: ICD-10-CM

## 2022-04-04 DIAGNOSIS — K21.9 GASTROESOPHAGEAL REFLUX DISEASE WITHOUT ESOPHAGITIS: ICD-10-CM

## 2022-04-04 DIAGNOSIS — R14.0 ABDOMINAL BLOATING: ICD-10-CM

## 2022-04-04 DIAGNOSIS — R93.3 ABNORMAL DIGESTIVE SYSTEM DIAGNOSTIC IMAGING: ICD-10-CM

## 2022-04-04 DIAGNOSIS — R11.0 NAUSEA: ICD-10-CM

## 2022-04-04 PROCEDURE — 99214 OFFICE O/P EST MOD 30 MIN: CPT | Performed by: NURSE PRACTITIONER

## 2022-04-04 RX ORDER — RIZATRIPTAN BENZOATE 10 MG/1
1 TABLET AS NEEDED TABLET ORAL ONCE A DAY
Status: ACTIVE | COMMUNITY

## 2022-04-04 RX ORDER — AZELASTINE HYDROCHLORIDE 137 UG/1
1 PUFF IN EACH NOSTRIL SPRAY, METERED NASAL TWICE A DAY
Status: ACTIVE | COMMUNITY

## 2022-04-04 RX ORDER — VARENICLINE 0.03 MG/.05ML
1 SPRAY IN EACH NOSTRIL SPRAY NASAL TWICE A DAY
Status: ACTIVE | COMMUNITY

## 2022-04-04 RX ORDER — LINACLOTIDE 145 UG/1
1 CAPSULE AT LEAST 30 MINUTES BEFORE THE FIRST MEAL OF THE DAY ON AN EMPTY STOMACH CAPSULE, GELATIN COATED ORAL ONCE A DAY
Status: ACTIVE | COMMUNITY

## 2022-04-04 RX ORDER — CELECOXIB 100 MG/1
1 CAPSULE WITH FOOD CAPSULE ORAL ONCE A DAY
Status: DISCONTINUED | COMMUNITY

## 2022-04-04 RX ORDER — CIPROFLOXACIN HYDROCHLORIDE 500 MG/1
1 TABLET TABLET, FILM COATED ORAL
Qty: 28 TABLET | Refills: 0 | OUTPATIENT

## 2022-04-04 RX ORDER — BUDESONIDE AND FORMOTEROL FUMARATE DIHYDRATE 160; 4.5 UG/1; UG/1
2 PUFFS AEROSOL RESPIRATORY (INHALATION) TWICE A DAY
Status: ACTIVE | COMMUNITY

## 2022-04-04 RX ORDER — RIFAXIMIN 550 MG/1
1 TABLET TABLET ORAL THREE TIMES A DAY
Qty: 42 TABLET | Refills: 2 | Status: DISCONTINUED | COMMUNITY

## 2022-04-04 RX ORDER — ESOMEPRAZOLE MAGNESIUM 40 MG/1
1 CAPSULE CAPSULE, DELAYED RELEASE ORAL ONCE A DAY
Qty: 90 | Refills: 3 | Status: DISCONTINUED | COMMUNITY

## 2022-04-04 RX ORDER — FAMOTIDINE 40 MG/1
1 TABLET AT BEDTIME TABLET, FILM COATED ORAL TWICE A DAY
Qty: 60 | Refills: 2 | Status: DISCONTINUED | COMMUNITY

## 2022-04-04 RX ORDER — BUPROPION HYDROCHLORIDE 150 MG/1
1 TABLET IN THE MORNING TABLET, FILM COATED, EXTENDED RELEASE ORAL ONCE A DAY
Status: ACTIVE | COMMUNITY

## 2022-04-04 RX ORDER — ROSUVASTATIN CALCIUM 10 MG/1
1 TABLET TABLET, FILM COATED ORAL ONCE A DAY
Status: ACTIVE | COMMUNITY

## 2022-04-04 RX ORDER — TIOTROPIUM BROMIDE INHALATION SPRAY 1.56 UG/1
2 PUFFS SPRAY, METERED RESPIRATORY (INHALATION) ONCE A DAY
Status: ACTIVE | COMMUNITY

## 2022-04-04 RX ORDER — MONTELUKAST 10 MG/1
1 TABLET TABLET, FILM COATED ORAL ONCE A DAY
Status: ACTIVE | COMMUNITY

## 2022-04-04 RX ORDER — TRAZODONE HYDROCHLORIDE 100 MG/1
1 TABLET AT BEDTIME TABLET ORAL ONCE A DAY
Status: ACTIVE | COMMUNITY

## 2022-04-04 RX ORDER — BUSPIRONE HYDROCHLORIDE 10 MG/1
1 TABLET TABLET ORAL TWICE A DAY
Status: ACTIVE | COMMUNITY

## 2022-04-04 RX ORDER — ESOMEPRAZOLE MAGNESIUM 40 MG/1
1 CAPSULE CAPSULE, DELAYED RELEASE ORAL TWICE A DAY
Status: ACTIVE | COMMUNITY
Start: 2020-03-23

## 2022-04-04 RX ORDER — TITANIUM DIOXIDE, OCTINOXATE, ZINC OXIDE 4.61; 1.6; .78 G/40ML; G/40ML; G/40ML
1 CAPSULE CREAM TOPICAL ONCE A DAY
Status: ACTIVE | COMMUNITY

## 2022-04-04 RX ORDER — FLUTICASONE PROPIONATE 50 UG/1
1 SPRAY IN EACH NOSTRIL SPRAY, METERED NASAL ONCE A DAY
Status: ACTIVE | COMMUNITY

## 2022-04-04 RX ORDER — SULFAMETHOXAZOLE AND TRIMETHOPRIM 800; 160 MG/1; MG/1
1 TABLET TABLET ORAL TWICE A DAY
Qty: 20 | Status: DISCONTINUED | COMMUNITY
Start: 2022-03-28 | End: 2022-04-07

## 2022-04-04 RX ORDER — PLECANATIDE 3 MG/1
1 TABLET TABLET ORAL ONCE A DAY
Qty: 30 | Refills: 3 | Status: DISCONTINUED | COMMUNITY

## 2022-04-04 RX ORDER — HYDROCODONE BITARTRATE AND ACETAMINOPHEN 5; 325 MG/1; MG/1
1 TABLET AS NEEDED TABLET ORAL
Status: DISCONTINUED | COMMUNITY

## 2022-04-04 RX ORDER — CYCLOBENZAPRINE HYDROCHLORIDE 10 MG/1
1 TABLET AT BEDTIME AS NEEDED TABLET, FILM COATED ORAL ONCE A DAY
Status: ACTIVE | COMMUNITY

## 2022-04-04 RX ORDER — HYDROCODONE BITARTRATE AND ACETAMINOPHEN 7.5; 325 MG/1; MG/1
1 TABLET AS NEEDED TABLET ORAL
Status: ACTIVE | COMMUNITY

## 2022-04-04 RX ORDER — ONDANSETRON HYDROCHLORIDE 4 MG/1
1 TABLET AS NEEDED TABLET, FILM COATED ORAL ONCE A DAY
Status: ACTIVE | COMMUNITY
Start: 2020-05-27

## 2022-04-04 RX ORDER — HYDROCHLOROTHIAZIDE 12.5 MG/1
1 TABLET IN THE MORNING CAPSULE ORAL ONCE A DAY
Status: ACTIVE | COMMUNITY

## 2022-04-04 RX ORDER — ASPIRIN 81 MG/1
1 TABLET TABLET, COATED ORAL ONCE A DAY
Status: DISCONTINUED | COMMUNITY

## 2022-04-04 RX ORDER — DOCUSATE SODIUM 50 MG AND SENNOSIDES 8.6 MG 8.6; 5 MG/1; MG/1
1 TABLET IN THE EVENING AS NEEDED TABLET, FILM COATED ORAL ONCE A DAY
Status: ACTIVE | COMMUNITY

## 2022-04-04 RX ORDER — DOCUSATE SODIUM 100 MG/1
1 CAPSULE AS NEEDED CAPSULE, LIQUID FILLED ORAL ONCE A DAY
Status: ACTIVE | COMMUNITY

## 2022-04-04 RX ORDER — METHOCARBAMOL 500 MG/1
1.5 TABLETS TABLET ORAL
Status: ACTIVE | COMMUNITY

## 2022-04-04 RX ORDER — BUPROPION HYDROCHLORIDE 300 MG/1
1 TABLET IN THE MORNING TABLET, EXTENDED RELEASE ORAL ONCE A DAY
Status: DISCONTINUED | COMMUNITY

## 2022-04-04 RX ORDER — METRONIDAZOLE 500 MG/1
1 TABLET TABLET ORAL THREE TIMES A DAY
Qty: 42 TABLET | Refills: 0 | OUTPATIENT

## 2022-04-04 NOTE — PHYSICAL EXAM GASTROINTESTINAL
Abdomen , soft, mild generalized tenderness, nondistended , no guarding or rigidity , no masses palpable , hypoactive bowel sounds , Liver and Spleen , no hepatomegaly present , no hepatosplenomegaly , liver nontender , spleen not palpable

## 2022-04-04 NOTE — HPI-TODAY'S VISIT:
61 year old female presents for follow up after hospitalization for nausea and abdominal pain 2 weeks ago. Pain was from left upper abdomen to left lower abdomen. She had vomiting x1. An NGT was passed and she reports having drainage to suction. A surgeon was consulted and she was told she did not need surgery. CT showed small bowel edema. Small bowel follow through was negative for obstruction and showed liquid stool in colon. She has a history of constipation but had diarrhea after constrast. The patient has not had recent nausea but feels stomach queasiness and some lingering discomfort for which she takes Dicyclomine once a day now. She was taking Nexium BID but now is taking once daily. Abdominal bloating continues daily despite completing a week of Xifaxan and Bactrim. Prior to last visit, she was given Carafate for epigastric pain and it helped. She has a history of constipation and used to use Senna, Aloe, and flax seed oil.  Linzess caused diarrhea.   Trulance did not help.   Last EGD/colon in 10/2021 and showed a hiatal hernia, tubular adenoma, and internal hemorrhoids. ENT scheduled Famotidine 40mg prn. Asthma and sleep apnea is worsening and she may have to start Bi-pap, but currently on C-Pap. EGD and Bravo 5/2020 which was positive and the patient saw Dr. Amato for consultation for lap nissen. In 2013, she had a partial colectomy due to diverticulitis.

## 2022-04-05 PROBLEM — 116289008: Status: ACTIVE | Noted: 2022-02-03

## 2022-04-05 PROBLEM — 386618008: Status: ACTIVE | Noted: 2022-04-05

## 2022-04-05 PROBLEM — 102614006: Status: ACTIVE | Noted: 2022-04-04

## 2022-04-05 PROBLEM — 266435005: Status: ACTIVE | Noted: 2021-08-30

## 2022-04-05 PROBLEM — 84089009 HIATAL HERNIA: Status: ACTIVE | Noted: 2018-01-03

## 2022-05-09 ENCOUNTER — OFFICE VISIT (OUTPATIENT)
Dept: URBAN - METROPOLITAN AREA CLINIC 48 | Facility: CLINIC | Age: 62
End: 2022-05-09

## 2022-05-17 ENCOUNTER — OFFICE VISIT (OUTPATIENT)
Dept: URBAN - METROPOLITAN AREA CLINIC 48 | Facility: CLINIC | Age: 62
End: 2022-05-17
Payer: COMMERCIAL

## 2022-05-17 VITALS
SYSTOLIC BLOOD PRESSURE: 121 MMHG | TEMPERATURE: 97.9 F | HEIGHT: 60 IN | BODY MASS INDEX: 39.11 KG/M2 | WEIGHT: 199.2 LBS | OXYGEN SATURATION: 98 % | DIASTOLIC BLOOD PRESSURE: 76 MMHG | HEART RATE: 78 BPM

## 2022-05-17 DIAGNOSIS — R14.0 BLOATING SYMPTOM: ICD-10-CM

## 2022-05-17 DIAGNOSIS — R10.32 LLQ ABDOMINAL PAIN: ICD-10-CM

## 2022-05-17 DIAGNOSIS — R19.4 CHANGE IN BOWEL HABIT: ICD-10-CM

## 2022-05-17 DIAGNOSIS — K21.9 GERD: ICD-10-CM

## 2022-05-17 DIAGNOSIS — R11.0 NAUSEA: ICD-10-CM

## 2022-05-17 PROCEDURE — 99214 OFFICE O/P EST MOD 30 MIN: CPT | Performed by: INTERNAL MEDICINE

## 2022-05-17 RX ORDER — ESOMEPRAZOLE MAGNESIUM 40 MG/1
1 CAPSULE CAPSULE, DELAYED RELEASE ORAL TWICE A DAY
Status: ACTIVE | COMMUNITY
Start: 2020-03-23

## 2022-05-17 RX ORDER — HYDROCHLOROTHIAZIDE 12.5 MG/1
1 TABLET IN THE MORNING CAPSULE ORAL ONCE A DAY
Status: ACTIVE | COMMUNITY

## 2022-05-17 RX ORDER — CIPROFLOXACIN HYDROCHLORIDE 500 MG/1
1 TABLET TABLET, FILM COATED ORAL
Qty: 28 TABLET | Refills: 0 | Status: ACTIVE | COMMUNITY

## 2022-05-17 RX ORDER — BUDESONIDE AND FORMOTEROL FUMARATE DIHYDRATE 160; 4.5 UG/1; UG/1
2 PUFFS AEROSOL RESPIRATORY (INHALATION) TWICE A DAY
Status: ACTIVE | COMMUNITY

## 2022-05-17 RX ORDER — TRAZODONE HYDROCHLORIDE 100 MG/1
1 TABLET AT BEDTIME TABLET ORAL ONCE A DAY
Status: ACTIVE | COMMUNITY

## 2022-05-17 RX ORDER — MONTELUKAST 10 MG/1
1 TABLET TABLET, FILM COATED ORAL ONCE A DAY
Status: ACTIVE | COMMUNITY

## 2022-05-17 RX ORDER — METRONIDAZOLE 500 MG/1
1 TABLET TABLET ORAL THREE TIMES A DAY
Qty: 42 TABLET | Refills: 0 | Status: ACTIVE | COMMUNITY

## 2022-05-17 RX ORDER — TITANIUM DIOXIDE, OCTINOXATE, ZINC OXIDE 4.61; 1.6; .78 G/40ML; G/40ML; G/40ML
1 CAPSULE CREAM TOPICAL ONCE A DAY
Status: ACTIVE | COMMUNITY

## 2022-05-17 RX ORDER — BUPROPION HYDROCHLORIDE 150 MG/1
1 TABLET IN THE MORNING TABLET, FILM COATED, EXTENDED RELEASE ORAL ONCE A DAY
Status: ACTIVE | COMMUNITY

## 2022-05-17 RX ORDER — RIZATRIPTAN BENZOATE 10 MG/1
1 TABLET AS NEEDED TABLET ORAL ONCE A DAY
Status: ACTIVE | COMMUNITY

## 2022-05-17 RX ORDER — AZELASTINE HYDROCHLORIDE 137 UG/1
1 PUFF IN EACH NOSTRIL SPRAY, METERED NASAL TWICE A DAY
Status: ACTIVE | COMMUNITY

## 2022-05-17 RX ORDER — METHOCARBAMOL 500 MG/1
1.5 TABLETS TABLET ORAL
Status: ACTIVE | COMMUNITY

## 2022-05-17 RX ORDER — CYCLOBENZAPRINE HYDROCHLORIDE 10 MG/1
1 TABLET AT BEDTIME AS NEEDED TABLET, FILM COATED ORAL ONCE A DAY
Status: ACTIVE | COMMUNITY

## 2022-05-17 RX ORDER — DOCUSATE SODIUM 50 MG AND SENNOSIDES 8.6 MG 8.6; 5 MG/1; MG/1
1 TABLET IN THE EVENING AS NEEDED TABLET, FILM COATED ORAL ONCE A DAY
Status: ACTIVE | COMMUNITY

## 2022-05-17 RX ORDER — TIOTROPIUM BROMIDE INHALATION SPRAY 1.56 UG/1
2 PUFFS SPRAY, METERED RESPIRATORY (INHALATION) ONCE A DAY
Status: ACTIVE | COMMUNITY

## 2022-05-17 RX ORDER — VARENICLINE 0.03 MG/.05ML
1 SPRAY IN EACH NOSTRIL SPRAY NASAL TWICE A DAY
Status: ACTIVE | COMMUNITY

## 2022-05-17 RX ORDER — BUSPIRONE HYDROCHLORIDE 10 MG/1
1 TABLET TABLET ORAL TWICE A DAY
Status: ACTIVE | COMMUNITY

## 2022-05-17 RX ORDER — HYDROCODONE BITARTRATE AND ACETAMINOPHEN 7.5; 325 MG/1; MG/1
1 TABLET AS NEEDED TABLET ORAL
Status: ACTIVE | COMMUNITY

## 2022-05-17 RX ORDER — DOCUSATE SODIUM 100 MG/1
1 CAPSULE AS NEEDED CAPSULE, LIQUID FILLED ORAL ONCE A DAY
Status: ACTIVE | COMMUNITY

## 2022-05-17 RX ORDER — ONDANSETRON HYDROCHLORIDE 4 MG/1
1 TABLET AS NEEDED TABLET, FILM COATED ORAL ONCE A DAY
Status: ACTIVE | COMMUNITY
Start: 2020-05-27

## 2022-05-17 RX ORDER — FLUTICASONE PROPIONATE 50 UG/1
1 SPRAY IN EACH NOSTRIL SPRAY, METERED NASAL ONCE A DAY
Status: ACTIVE | COMMUNITY

## 2022-05-17 RX ORDER — ROSUVASTATIN CALCIUM 10 MG/1
1 TABLET TABLET, FILM COATED ORAL ONCE A DAY
Status: ACTIVE | COMMUNITY

## 2022-05-17 RX ORDER — LINACLOTIDE 145 UG/1
1 CAPSULE AT LEAST 30 MINUTES BEFORE THE FIRST MEAL OF THE DAY ON AN EMPTY STOMACH CAPSULE, GELATIN COATED ORAL ONCE A DAY
Status: ACTIVE | COMMUNITY

## 2022-05-17 RX ORDER — AMOXICILLIN AND CLAVULANATE POTASSIUM 875; 125 MG/1; MG/1
1 TABLET TABLET, FILM COATED ORAL
Qty: 20 | OUTPATIENT
Start: 2022-05-17 | End: 2022-05-27

## 2022-05-17 NOTE — HPI-TODAY'S VISIT:
61 year old female presents for follow up. Today, she presents with worsening LLQ pain and bloating. The patient was hospitalized in 3/2022 for possible partial sbo with pain and nausea/vomiting. A surgeon was consulted and she was told she did not need surgery. CT showed small bowel edema. Small bowel follow through was negative for obstruction and showed liquid stool in colon. She has a history of constipation and had been managing with flax seed and senna but recently stopped due to beef allergy (ingredient in flax seed).  In the past, she has taken Dicyclomine for abdominal pain but has stopped it due to constipation. Linzess caused  diarrhea. She feels like she has intestinal inflammation and generalized abdominal pain along with abdominal bloating. Bloating is worse after eating.  At her last visit, she was prescribed Cipro and Flagyl but she did not complete. She has been to an allergist. Xifaxan samples have helped some previously. In 2013, she had a partial colectomy due to diverticulitis.   GERD has been better controlled on Esomeprazole once daily.   Last EGD/colon in 10/2021 showed a hiatal hernia, tubular adenoma, and internal hemorrhoids. ENT scheduled Famotidine 40mg prn. She sees ENT for sleep apnea and asthma. EGD and Bravo 5/2020 which was positive and the patient saw Dr. Amato for consultation for lap nissen.

## 2022-05-17 NOTE — PHYSICAL EXAM GASTROINTESTINAL
Abdomen , soft, moderate LLQ tenderness, nondistended , no guarding or rigidity , no masses palpable , hypoactive bowel sounds , Liver and Spleen , no hepatomegaly present , no hepatosplenomegaly , liver nontender , spleen not palpable

## 2022-05-23 ENCOUNTER — WEB ENCOUNTER (OUTPATIENT)
Dept: URBAN - METROPOLITAN AREA CLINIC 44 | Facility: CLINIC | Age: 62
End: 2022-05-23

## 2022-06-01 ENCOUNTER — WEB ENCOUNTER (OUTPATIENT)
Dept: URBAN - METROPOLITAN AREA CLINIC 44 | Facility: CLINIC | Age: 62
End: 2022-06-01

## 2022-06-11 ENCOUNTER — WEB ENCOUNTER (OUTPATIENT)
Dept: URBAN - METROPOLITAN AREA CLINIC 44 | Facility: CLINIC | Age: 62
End: 2022-06-11

## 2022-06-23 ENCOUNTER — OFFICE VISIT (OUTPATIENT)
Dept: URBAN - METROPOLITAN AREA CLINIC 48 | Facility: CLINIC | Age: 62
End: 2022-06-23

## 2022-06-23 RX ORDER — TRAZODONE HYDROCHLORIDE 100 MG/1
1 TABLET AT BEDTIME TABLET ORAL ONCE A DAY
COMMUNITY

## 2022-06-23 RX ORDER — AZELASTINE HYDROCHLORIDE 137 UG/1
1 PUFF IN EACH NOSTRIL SPRAY, METERED NASAL TWICE A DAY
COMMUNITY

## 2022-06-23 RX ORDER — HYDROCODONE BITARTRATE AND ACETAMINOPHEN 7.5; 325 MG/1; MG/1
1 TABLET AS NEEDED TABLET ORAL
COMMUNITY

## 2022-06-23 RX ORDER — METRONIDAZOLE 500 MG/1
1 TABLET TABLET ORAL THREE TIMES A DAY
Qty: 42 TABLET | Refills: 0 | COMMUNITY

## 2022-06-23 RX ORDER — ROSUVASTATIN CALCIUM 10 MG/1
1 TABLET TABLET, FILM COATED ORAL ONCE A DAY
COMMUNITY

## 2022-06-23 RX ORDER — FLUTICASONE PROPIONATE 50 UG/1
1 SPRAY IN EACH NOSTRIL SPRAY, METERED NASAL ONCE A DAY
COMMUNITY

## 2022-06-23 RX ORDER — BUSPIRONE HYDROCHLORIDE 10 MG/1
1 TABLET TABLET ORAL TWICE A DAY
COMMUNITY

## 2022-06-23 RX ORDER — CYCLOBENZAPRINE HYDROCHLORIDE 10 MG/1
1 TABLET AT BEDTIME AS NEEDED TABLET, FILM COATED ORAL ONCE A DAY
COMMUNITY

## 2022-06-23 RX ORDER — ESOMEPRAZOLE MAGNESIUM 40 MG/1
1 CAPSULE CAPSULE, DELAYED RELEASE ORAL TWICE A DAY
COMMUNITY
Start: 2020-03-23

## 2022-06-23 RX ORDER — VARENICLINE 0.03 MG/.05ML
1 SPRAY IN EACH NOSTRIL SPRAY NASAL TWICE A DAY
COMMUNITY

## 2022-06-23 RX ORDER — BUPROPION HYDROCHLORIDE 150 MG/1
1 TABLET IN THE MORNING TABLET, FILM COATED, EXTENDED RELEASE ORAL ONCE A DAY
COMMUNITY

## 2022-06-23 RX ORDER — RIZATRIPTAN BENZOATE 10 MG/1
1 TABLET AS NEEDED TABLET ORAL ONCE A DAY
COMMUNITY

## 2022-06-23 RX ORDER — TIOTROPIUM BROMIDE INHALATION SPRAY 1.56 UG/1
2 PUFFS SPRAY, METERED RESPIRATORY (INHALATION) ONCE A DAY
COMMUNITY

## 2022-06-23 RX ORDER — BUDESONIDE AND FORMOTEROL FUMARATE DIHYDRATE 160; 4.5 UG/1; UG/1
2 PUFFS AEROSOL RESPIRATORY (INHALATION) TWICE A DAY
COMMUNITY

## 2022-06-23 RX ORDER — HYDROCHLOROTHIAZIDE 12.5 MG/1
1 TABLET IN THE MORNING CAPSULE ORAL ONCE A DAY
COMMUNITY

## 2022-06-23 RX ORDER — MONTELUKAST 10 MG/1
1 TABLET TABLET, FILM COATED ORAL ONCE A DAY
COMMUNITY

## 2022-06-23 RX ORDER — METHOCARBAMOL 500 MG/1
1.5 TABLETS TABLET ORAL
COMMUNITY

## 2022-06-23 RX ORDER — CIPROFLOXACIN HYDROCHLORIDE 500 MG/1
1 TABLET TABLET, FILM COATED ORAL
Qty: 28 TABLET | Refills: 0 | COMMUNITY

## 2022-06-23 RX ORDER — DOCUSATE SODIUM 100 MG/1
1 CAPSULE AS NEEDED CAPSULE, LIQUID FILLED ORAL ONCE A DAY
COMMUNITY

## 2022-06-23 RX ORDER — LINACLOTIDE 145 UG/1
1 CAPSULE AT LEAST 30 MINUTES BEFORE THE FIRST MEAL OF THE DAY ON AN EMPTY STOMACH CAPSULE, GELATIN COATED ORAL ONCE A DAY
COMMUNITY

## 2022-06-23 RX ORDER — TITANIUM DIOXIDE, OCTINOXATE, ZINC OXIDE 4.61; 1.6; .78 G/40ML; G/40ML; G/40ML
1 CAPSULE CREAM TOPICAL ONCE A DAY
COMMUNITY

## 2022-06-23 RX ORDER — ONDANSETRON HYDROCHLORIDE 4 MG/1
1 TABLET AS NEEDED TABLET, FILM COATED ORAL ONCE A DAY
COMMUNITY
Start: 2020-05-27

## 2022-06-23 RX ORDER — DOCUSATE SODIUM 50 MG AND SENNOSIDES 8.6 MG 8.6; 5 MG/1; MG/1
1 TABLET IN THE EVENING AS NEEDED TABLET, FILM COATED ORAL ONCE A DAY
COMMUNITY

## 2022-06-24 ENCOUNTER — OFFICE VISIT (OUTPATIENT)
Dept: URBAN - METROPOLITAN AREA TELEHEALTH 2 | Facility: TELEHEALTH | Age: 62
End: 2022-06-24
Payer: COMMERCIAL

## 2022-06-24 VITALS — WEIGHT: 199 LBS | BODY MASS INDEX: 39.07 KG/M2 | HEIGHT: 60 IN

## 2022-06-24 DIAGNOSIS — R14.0 BLOATING SYMPTOM: ICD-10-CM

## 2022-06-24 DIAGNOSIS — R19.4 CHANGE IN BOWEL HABIT: ICD-10-CM

## 2022-06-24 DIAGNOSIS — K21.9 GERD: ICD-10-CM

## 2022-06-24 DIAGNOSIS — R10.32 LLQ ABDOMINAL PAIN: ICD-10-CM

## 2022-06-24 DIAGNOSIS — R11.0 NAUSEA: ICD-10-CM

## 2022-06-24 PROBLEM — 301716002: Status: ACTIVE | Noted: 2022-05-17

## 2022-06-24 PROBLEM — 248490000 BLOATING SYMPTOM: Status: ACTIVE | Noted: 2022-05-17

## 2022-06-24 PROBLEM — 422587007: Status: ACTIVE | Noted: 2022-03-03

## 2022-06-24 PROBLEM — 235595009 GASTROESOPHAGEAL REFLUX DISEASE: Status: ACTIVE | Noted: 2018-01-03

## 2022-06-24 PROCEDURE — 99214 OFFICE O/P EST MOD 30 MIN: CPT | Performed by: NURSE PRACTITIONER

## 2022-06-24 RX ORDER — ONDANSETRON HYDROCHLORIDE 4 MG/1
1 TABLET AS NEEDED TABLET, FILM COATED ORAL ONCE A DAY
Status: ACTIVE | COMMUNITY
Start: 2020-05-27

## 2022-06-24 RX ORDER — RIZATRIPTAN BENZOATE 10 MG/1
1 TABLET AS NEEDED TABLET ORAL ONCE A DAY
Status: ACTIVE | COMMUNITY

## 2022-06-24 RX ORDER — CYCLOBENZAPRINE HYDROCHLORIDE 10 MG/1
1 TABLET AT BEDTIME AS NEEDED TABLET, FILM COATED ORAL ONCE A DAY
Status: ACTIVE | COMMUNITY

## 2022-06-24 RX ORDER — LINACLOTIDE 145 UG/1
1 CAPSULE AT LEAST 30 MINUTES BEFORE THE FIRST MEAL OF THE DAY ON AN EMPTY STOMACH CAPSULE, GELATIN COATED ORAL ONCE A DAY
Status: ACTIVE | COMMUNITY

## 2022-06-24 RX ORDER — HYDROCODONE BITARTRATE AND ACETAMINOPHEN 7.5; 325 MG/1; MG/1
1 TABLET AS NEEDED TABLET ORAL
Status: ACTIVE | COMMUNITY

## 2022-06-24 RX ORDER — BUSPIRONE HYDROCHLORIDE 10 MG/1
1 TABLET TABLET ORAL TWICE A DAY
Status: ACTIVE | COMMUNITY

## 2022-06-24 RX ORDER — AZELASTINE HYDROCHLORIDE 137 UG/1
1 PUFF IN EACH NOSTRIL SPRAY, METERED NASAL TWICE A DAY
Status: ACTIVE | COMMUNITY

## 2022-06-24 RX ORDER — FLUTICASONE PROPIONATE 50 UG/1
1 SPRAY IN EACH NOSTRIL SPRAY, METERED NASAL ONCE A DAY
Status: ACTIVE | COMMUNITY

## 2022-06-24 RX ORDER — HYDROCHLOROTHIAZIDE 12.5 MG/1
1 TABLET IN THE MORNING CAPSULE ORAL ONCE A DAY
Status: ACTIVE | COMMUNITY

## 2022-06-24 RX ORDER — ESOMEPRAZOLE MAGNESIUM 20 MG/1
1 CAPSULE CAPSULE, DELAYED RELEASE ORAL TWICE A DAY
Qty: 60 | Refills: 4 | OUTPATIENT

## 2022-06-24 RX ORDER — METHOCARBAMOL 500 MG/1
1.5 TABLETS TABLET ORAL
Status: ACTIVE | COMMUNITY

## 2022-06-24 RX ORDER — TRAZODONE HYDROCHLORIDE 100 MG/1
1 TABLET AT BEDTIME TABLET ORAL ONCE A DAY
Status: ACTIVE | COMMUNITY

## 2022-06-24 RX ORDER — BUDESONIDE AND FORMOTEROL FUMARATE DIHYDRATE 160; 4.5 UG/1; UG/1
2 PUFFS AEROSOL RESPIRATORY (INHALATION) TWICE A DAY
Status: ACTIVE | COMMUNITY

## 2022-06-24 RX ORDER — ROSUVASTATIN CALCIUM 10 MG/1
1 TABLET TABLET, FILM COATED ORAL ONCE A DAY
Status: ACTIVE | COMMUNITY

## 2022-06-24 RX ORDER — ESOMEPRAZOLE MAGNESIUM 40 MG/1
1 CAPSULE CAPSULE, DELAYED RELEASE ORAL
Status: ACTIVE | COMMUNITY
Start: 2020-03-23

## 2022-06-24 RX ORDER — DOCUSATE SODIUM 100 MG/1
1 CAPSULE AS NEEDED CAPSULE, LIQUID FILLED ORAL ONCE A DAY
Status: ACTIVE | COMMUNITY

## 2022-06-24 RX ORDER — DOCUSATE SODIUM 50 MG AND SENNOSIDES 8.6 MG 8.6; 5 MG/1; MG/1
1 TABLET IN THE EVENING AS NEEDED TABLET, FILM COATED ORAL ONCE A DAY
Status: ACTIVE | COMMUNITY

## 2022-06-24 RX ORDER — TIOTROPIUM BROMIDE INHALATION SPRAY 1.56 UG/1
2 PUFFS SPRAY, METERED RESPIRATORY (INHALATION) ONCE A DAY
Status: ACTIVE | COMMUNITY

## 2022-06-24 RX ORDER — VARENICLINE 0.03 MG/.05ML
1 SPRAY IN EACH NOSTRIL SPRAY NASAL TWICE A DAY
Status: ACTIVE | COMMUNITY

## 2022-06-24 RX ORDER — BUPROPION HYDROCHLORIDE 150 MG/1
1 TABLET IN THE MORNING TABLET, FILM COATED, EXTENDED RELEASE ORAL ONCE A DAY
Status: ACTIVE | COMMUNITY

## 2022-06-24 NOTE — HPI-TODAY'S VISIT:
62 year old female presents via telehealth for follow up. GERD has worsened despite Esomeprazole 40mg. She has had more cough, asthma, and hoarseness.  She has had intermittent episodes of bloating and nausea that she often relates to diet (beef allergy). She feels like she has intestinal inflammation and generalized abdominal pain along with abdominal bloating. Bloating is worse after eating. She has been to an allergist and they are retesting Alphagal. Xifaxan samples have helped some previously. At her last visit she was started empirically on Augmentin for possible diverticulitis. She has a hx of diverticulitis in 2013 s/p partial colectomy. Prior, she was prescribed Cipro and Flagyl but she did not complete.    The patient was hospitalized in 3/2022 for possible partial sbo with pain and nausea/vomiting. A surgeon was consulted and she was told she did not need surgery. CT showed small bowel edema. Small bowel follow through 3/2022 was negative for obstruction and showed liquid stool in colon.   She has a history of constipation and had been managing with flax seed and senna but recently stopped due to beef allergy (ingredient in flax seed).  In the past, she has taken Dicyclomine for abdominal pain but has stopped it due to constipation. Linzess caused  diarrhea.    Last EGD/colon in 10/2021 showed a hiatal hernia, tubular adenoma, and internal hemorrhoids. ENT scheduled Famotidine 40mg prn. She sees ENT for sleep apnea and asthma. EGD and Bravo 5/2020 which was positive and the patient saw Dr. Amato for consultation for lap nissen.

## 2022-07-11 ENCOUNTER — WEB ENCOUNTER (OUTPATIENT)
Dept: URBAN - METROPOLITAN AREA CLINIC 48 | Facility: CLINIC | Age: 62
End: 2022-07-11

## 2022-07-13 ENCOUNTER — WEB ENCOUNTER (OUTPATIENT)
Dept: URBAN - METROPOLITAN AREA CLINIC 44 | Facility: CLINIC | Age: 62
End: 2022-07-13

## 2022-07-25 ENCOUNTER — WEB ENCOUNTER (OUTPATIENT)
Dept: URBAN - METROPOLITAN AREA CLINIC 48 | Facility: CLINIC | Age: 62
End: 2022-07-25

## 2022-11-29 ENCOUNTER — WEB ENCOUNTER (OUTPATIENT)
Dept: URBAN - METROPOLITAN AREA CLINIC 48 | Facility: CLINIC | Age: 62
End: 2022-11-29

## 2022-11-29 RX ORDER — ESOMEPRAZOLE MAGNESIUM 20 MG/1
1 CAPSULE IN THE MORNING AND 2 CAPSULES AT NIGHT CAPSULE, DELAYED RELEASE ORAL TWICE A DAY
Qty: 90 | Refills: 6

## 2022-12-06 ENCOUNTER — ERX REFILL RESPONSE (OUTPATIENT)
Dept: URBAN - METROPOLITAN AREA CLINIC 44 | Facility: CLINIC | Age: 62
End: 2022-12-06

## 2022-12-06 RX ORDER — ESOMEPRAZOLE MAGNESIUM 20 MG/1
1 CAPSULE CAPSULE, DELAYED RELEASE ORAL ONCE A DAY
Qty: 90 CAPSULE | Refills: 2 | OUTPATIENT

## 2022-12-06 RX ORDER — ESOMEPRAZOLE MAGNESIUM 20 MG/1
1 CAPSULE IN THE MORNING AND 2 CAPSULES AT NIGHT CAPSULE, DELAYED RELEASE ORAL TWICE A DAY
Qty: 90 | Refills: 6 | OUTPATIENT

## 2023-01-23 ENCOUNTER — ERX REFILL RESPONSE (OUTPATIENT)
Dept: URBAN - METROPOLITAN AREA CLINIC 44 | Facility: CLINIC | Age: 63
End: 2023-01-23

## 2023-01-23 RX ORDER — ESOMEPRAZOLE MAGNESIUM 20 MG/1
TAKE 1 CAPSULE BY MOUTH TWICE DAILY FOR 30 DAYS CAPSULE, DELAYED RELEASE ORAL
Qty: 60 CAPSULE | Refills: 0 | OUTPATIENT

## 2023-01-23 RX ORDER — ESOMEPRAZOLE MAGNESIUM 20 MG/1
1 CAPSULE CAPSULE, DELAYED RELEASE ORAL ONCE A DAY
Qty: 90 CAPSULE | Refills: 2 | OUTPATIENT

## 2023-03-02 ENCOUNTER — ERX REFILL RESPONSE (OUTPATIENT)
Dept: URBAN - METROPOLITAN AREA CLINIC 44 | Facility: CLINIC | Age: 63
End: 2023-03-02

## 2023-03-02 RX ORDER — ESOMEPRAZOLE MAGNESIUM 20 MG/1
TAKE 1 CAPSULE BY MOUTH TWICE DAILY FOR 30 DAYS CAPSULE, DELAYED RELEASE ORAL
Qty: 60 CAPSULE | Refills: 0 | OUTPATIENT

## 2023-03-28 ENCOUNTER — OFFICE VISIT (OUTPATIENT)
Dept: URBAN - METROPOLITAN AREA CLINIC 48 | Facility: CLINIC | Age: 63
End: 2023-03-28
Payer: COMMERCIAL

## 2023-03-28 VITALS
HEIGHT: 60 IN | WEIGHT: 200 LBS | DIASTOLIC BLOOD PRESSURE: 75 MMHG | TEMPERATURE: 97.5 F | HEART RATE: 90 BPM | SYSTOLIC BLOOD PRESSURE: 120 MMHG | BODY MASS INDEX: 39.27 KG/M2

## 2023-03-28 DIAGNOSIS — R11.0 NAUSEA: ICD-10-CM

## 2023-03-28 DIAGNOSIS — R19.4 CHANGE IN BOWEL HABIT: ICD-10-CM

## 2023-03-28 DIAGNOSIS — K21.9 GERD: ICD-10-CM

## 2023-03-28 DIAGNOSIS — R14.0 BLOATING SYMPTOM: ICD-10-CM

## 2023-03-28 PROCEDURE — 99213 OFFICE O/P EST LOW 20 MIN: CPT | Performed by: INTERNAL MEDICINE

## 2023-03-28 RX ORDER — LINACLOTIDE 145 UG/1
1 CAPSULE AT LEAST 30 MINUTES BEFORE THE FIRST MEAL OF THE DAY ON AN EMPTY STOMACH CAPSULE, GELATIN COATED ORAL ONCE A DAY
Status: ACTIVE | COMMUNITY

## 2023-03-28 RX ORDER — RIFAXIMIN 550 MG/1
1 TABLET TABLET ORAL THREE TIMES A DAY
Qty: 42 TABLET | Refills: 1 | OUTPATIENT
Start: 2023-03-28 | End: 2023-04-27

## 2023-03-28 RX ORDER — CYCLOBENZAPRINE HYDROCHLORIDE 10 MG/1
1 TABLET AT BEDTIME AS NEEDED TABLET, FILM COATED ORAL ONCE A DAY
Status: ACTIVE | COMMUNITY

## 2023-03-28 RX ORDER — ROSUVASTATIN CALCIUM 10 MG/1
1 TABLET TABLET, FILM COATED ORAL ONCE A DAY
Status: ACTIVE | COMMUNITY

## 2023-03-28 RX ORDER — ESOMEPRAZOLE MAGNESIUM 20 MG/1
1 CAPSULE CAPSULE, DELAYED RELEASE ORAL TWICE A DAY
Qty: 60 CAPSULE | Refills: 5 | OUTPATIENT
Start: 2023-03-28

## 2023-03-28 RX ORDER — HYDROCHLOROTHIAZIDE 12.5 MG/1
1 TABLET IN THE MORNING CAPSULE ORAL ONCE A DAY
Status: ACTIVE | COMMUNITY

## 2023-03-28 RX ORDER — FLUTICASONE PROPIONATE 50 UG/1
1 SPRAY IN EACH NOSTRIL SPRAY, METERED NASAL ONCE A DAY
Status: ACTIVE | COMMUNITY

## 2023-03-28 RX ORDER — ALBUTEROL SULFATE 108 UG/1
1 PUFF AS NEEDED INHALANT RESPIRATORY (INHALATION)
Refills: 1 | Status: ACTIVE | COMMUNITY

## 2023-03-28 RX ORDER — BUSPIRONE HYDROCHLORIDE 10 MG/1
1 TABLET TABLET ORAL TWICE A DAY
Status: ACTIVE | COMMUNITY

## 2023-03-28 RX ORDER — ESOMEPRAZOLE MAGNESIUM 20 MG/1
TAKE 1 CAPSULE BY MOUTH TWICE DAILY FOR 30 DAYS CAPSULE, DELAYED RELEASE ORAL
Qty: 60 CAPSULE | Refills: 0 | Status: ACTIVE | COMMUNITY

## 2023-03-28 RX ORDER — TRAZODONE HYDROCHLORIDE 300 MG/1
1 TABLET AT BEDTIME AS NEEDED TABLET ORAL ONCE A DAY
Status: ACTIVE | COMMUNITY

## 2023-03-28 RX ORDER — DOCUSATE SODIUM 100 MG/1
1 CAPSULE AS NEEDED CAPSULE, LIQUID FILLED ORAL ONCE A DAY
Status: ACTIVE | COMMUNITY

## 2023-03-28 RX ORDER — TIOTROPIUM BROMIDE INHALATION SPRAY 1.56 UG/1
2 PUFFS SPRAY, METERED RESPIRATORY (INHALATION) ONCE A DAY
Status: ACTIVE | COMMUNITY

## 2023-03-28 RX ORDER — VARENICLINE 0.03 MG/.05ML
1 SPRAY IN EACH NOSTRIL SPRAY NASAL TWICE A DAY
Status: ACTIVE | COMMUNITY

## 2023-03-28 RX ORDER — ONDANSETRON HYDROCHLORIDE 4 MG/1
1 TABLET AS NEEDED TABLET, FILM COATED ORAL ONCE A DAY
Status: ACTIVE | COMMUNITY
Start: 2020-05-27

## 2023-03-28 RX ORDER — BUDESONIDE AND FORMOTEROL FUMARATE DIHYDRATE 160; 4.5 UG/1; UG/1
2 PUFFS AEROSOL RESPIRATORY (INHALATION) TWICE A DAY
Status: ACTIVE | COMMUNITY

## 2023-03-28 RX ORDER — METHOCARBAMOL 500 MG/1
1.5 TABLETS TABLET ORAL
Status: ACTIVE | COMMUNITY

## 2023-03-28 RX ORDER — HYDROCODONE BITARTRATE AND ACETAMINOPHEN 7.5; 325 MG/1; MG/1
1 TABLET AS NEEDED TABLET ORAL
Status: ACTIVE | COMMUNITY

## 2023-03-28 RX ORDER — LIDOCAINE 50 MG/G
USE 1 NEW PATCH EXTERNALLY ONCE DAILY. REMOVE AFTER 12 HOURS PATCH TOPICAL
Qty: 30 EACH | Refills: 5 | Status: ACTIVE | COMMUNITY

## 2023-03-28 RX ORDER — RIZATRIPTAN BENZOATE 10 MG/1
1 TABLET AS NEEDED TABLET ORAL ONCE A DAY
Status: ACTIVE | COMMUNITY

## 2023-03-28 RX ORDER — BUPROPION HYDROCHLORIDE 150 MG/1
1 TABLET IN THE MORNING TABLET, FILM COATED, EXTENDED RELEASE ORAL ONCE A DAY
Status: ACTIVE | COMMUNITY

## 2023-03-28 RX ORDER — AZELASTINE HYDROCHLORIDE 137 UG/1
1 PUFF IN EACH NOSTRIL SPRAY, METERED NASAL TWICE A DAY
Status: ACTIVE | COMMUNITY

## 2023-03-28 RX ORDER — DOCUSATE SODIUM 50 MG AND SENNOSIDES 8.6 MG 8.6; 5 MG/1; MG/1
1 TABLET IN THE EVENING AS NEEDED TABLET, FILM COATED ORAL ONCE A DAY
Status: ACTIVE | COMMUNITY

## 2023-03-28 RX ORDER — IPRATROPIUM BROMIDE 21 UG/1
2 SPRAYS IN EACH NOSTRIL SPRAY, METERED NASAL TWICE A DAY
Refills: 5 | Status: ACTIVE | COMMUNITY

## 2023-03-28 NOTE — PHYSICAL EXAM GASTROINTESTINAL
Abdomen , soft, mild generalized tenderness, nondistended , no guarding or rigidity , no masses palpable , normal bowel sounds , Liver and Spleen,  no hepatosplenomegaly , liver nontender

## 2023-03-28 NOTE — HPI-TODAY'S VISIT:
62 year old female presents for follow up. Seen previously for GERD, bloating, and altered bowel habits. GERD is somewhat controlled on Esomeprazole 20mg prn. The patient feels GERD is worse off and on but does not want to take daily due to feeling she needs more acid to digest food. She continues to have hoarseness.  The patient  has intermittent episodes of bloatin that she often relates to diet (beef allergy). The patient has seen an allergist. Xifaxan samples have helped some previously. She has a hx of diverticulitis in 2013 s/p partial colectomy. Prior, she was prescribed Cipro and Flagyl but she did not complete.   The patient was hospitalized in 3/2022 for possible partial sbo with pain and nausea/vomiting. A surgeon was consulted and she was told she did not need surgery. CT showed small bowel edema. Small bowel follow through 3/2022 was negative for obstruction and showed liquid stool in colon.   She has a history of constipation and had been managing with senna and stool softener. In the past, she has taken Dicyclomine for abdominal pain but has stopped it due to constipation. Linzess caused  diarrhea.    Last EGD/colon in 10/2021 showed a hiatal hernia, tubular adenoma, and internal hemorrhoids. ENT scheduled Famotidine 40mg prn. She sees ENT for sleep apnea and asthma. EGD and Bravo 5/2020 which was positive and the patient saw Dr. Amato for consultation for lap nissen.  Currentley, she is having continued abdominal bloating and intermittent cosntipation. She is due to have a total knee replacement in 4/2023.

## 2023-04-17 ENCOUNTER — WEB ENCOUNTER (OUTPATIENT)
Dept: URBAN - METROPOLITAN AREA CLINIC 48 | Facility: CLINIC | Age: 63
End: 2023-04-17

## 2023-04-18 ENCOUNTER — WEB ENCOUNTER (OUTPATIENT)
Dept: URBAN - METROPOLITAN AREA CLINIC 48 | Facility: CLINIC | Age: 63
End: 2023-04-18

## 2023-04-18 RX ORDER — PANCRELIPASE 36000; 180000; 114000 [USP'U]/1; [USP'U]/1; [USP'U]/1
TAKE 2 WITH A MEAL AND 1 WITH A SNACK CAPSULE, DELAYED RELEASE PELLETS ORAL DAILY
Qty: 250 | Refills: 1 | OUTPATIENT
Start: 2023-04-24

## 2023-04-24 ENCOUNTER — WEB ENCOUNTER (OUTPATIENT)
Dept: URBAN - METROPOLITAN AREA CLINIC 48 | Facility: CLINIC | Age: 63
End: 2023-04-24

## 2023-07-17 ENCOUNTER — WEB ENCOUNTER (OUTPATIENT)
Dept: URBAN - METROPOLITAN AREA CLINIC 48 | Facility: CLINIC | Age: 63
End: 2023-07-17

## 2023-07-19 PROBLEM — 129851009: Status: ACTIVE | Noted: 2022-05-17

## 2023-07-20 ENCOUNTER — WEB ENCOUNTER (OUTPATIENT)
Dept: URBAN - METROPOLITAN AREA CLINIC 48 | Facility: CLINIC | Age: 63
End: 2023-07-20

## 2023-07-20 RX ORDER — LINACLOTIDE 145 UG/1
1 CAPSULE AT LEAST 30 MINUTES BEFORE THE FIRST MEAL OF THE DAY ON AN EMPTY STOMACH CAPSULE, GELATIN COATED ORAL ONCE A DAY
Status: ACTIVE | COMMUNITY

## 2023-07-20 RX ORDER — ROSUVASTATIN CALCIUM 10 MG/1
1 TABLET TABLET, FILM COATED ORAL ONCE A DAY
Status: ACTIVE | COMMUNITY

## 2023-07-20 RX ORDER — FLUTICASONE PROPIONATE 50 UG/1
1 SPRAY IN EACH NOSTRIL SPRAY, METERED NASAL ONCE A DAY
Status: ACTIVE | COMMUNITY

## 2023-07-20 RX ORDER — CYCLOBENZAPRINE HYDROCHLORIDE 10 MG/1
1 TABLET AT BEDTIME AS NEEDED TABLET, FILM COATED ORAL ONCE A DAY
Status: ACTIVE | COMMUNITY

## 2023-07-20 RX ORDER — DOCUSATE SODIUM 50 MG AND SENNOSIDES 8.6 MG 8.6; 5 MG/1; MG/1
1 TABLET IN THE EVENING AS NEEDED TABLET, FILM COATED ORAL ONCE A DAY
Status: ACTIVE | COMMUNITY

## 2023-07-20 RX ORDER — CIPROFLOXACIN HYDROCHLORIDE 500 MG/1
1 TABLET TABLET, FILM COATED ORAL
Qty: 14 TABLET | Refills: 0 | OUTPATIENT
Start: 2023-07-20 | End: 2023-07-23

## 2023-07-20 RX ORDER — IPRATROPIUM BROMIDE 21 UG/1
2 SPRAYS IN EACH NOSTRIL SPRAY, METERED NASAL TWICE A DAY
Refills: 5 | Status: ACTIVE | COMMUNITY

## 2023-07-20 RX ORDER — AZELASTINE HYDROCHLORIDE 137 UG/1
1 PUFF IN EACH NOSTRIL SPRAY, METERED NASAL TWICE A DAY
Status: ACTIVE | COMMUNITY

## 2023-07-20 RX ORDER — METRONIDAZOLE 500 MG/1
1 TABLET TABLET ORAL THREE TIMES A DAY
Qty: 21 TABLET | Refills: 0 | OUTPATIENT
Start: 2023-07-20 | End: 2023-08-03

## 2023-07-20 RX ORDER — TRAZODONE HYDROCHLORIDE 300 MG/1
1 TABLET AT BEDTIME AS NEEDED TABLET ORAL ONCE A DAY
Status: ACTIVE | COMMUNITY

## 2023-07-20 RX ORDER — HYDROCHLOROTHIAZIDE 12.5 MG/1
1 TABLET IN THE MORNING CAPSULE ORAL ONCE A DAY
Status: ACTIVE | COMMUNITY

## 2023-07-20 RX ORDER — LIDOCAINE 50 MG/G
USE 1 NEW PATCH EXTERNALLY ONCE DAILY. REMOVE AFTER 12 HOURS PATCH TOPICAL
Qty: 30 EACH | Refills: 5 | Status: ACTIVE | COMMUNITY

## 2023-07-20 RX ORDER — VARENICLINE 0.03 MG/.05ML
1 SPRAY IN EACH NOSTRIL SPRAY NASAL TWICE A DAY
Status: ACTIVE | COMMUNITY

## 2023-07-20 RX ORDER — DOCUSATE SODIUM 100 MG/1
1 CAPSULE AS NEEDED CAPSULE, LIQUID FILLED ORAL ONCE A DAY
Status: ACTIVE | COMMUNITY

## 2023-07-20 RX ORDER — BUPROPION HYDROCHLORIDE 150 MG/1
1 TABLET IN THE MORNING TABLET, FILM COATED, EXTENDED RELEASE ORAL ONCE A DAY
Status: ACTIVE | COMMUNITY

## 2023-07-20 RX ORDER — ALBUTEROL SULFATE 108 UG/1
1 PUFF AS NEEDED INHALANT RESPIRATORY (INHALATION)
Refills: 1 | Status: ACTIVE | COMMUNITY

## 2023-07-20 RX ORDER — TIOTROPIUM BROMIDE INHALATION SPRAY 1.56 UG/1
2 PUFFS SPRAY, METERED RESPIRATORY (INHALATION) ONCE A DAY
Status: ACTIVE | COMMUNITY

## 2023-07-20 RX ORDER — PANCRELIPASE 36000; 180000; 114000 [USP'U]/1; [USP'U]/1; [USP'U]/1
TAKE 2 WITH A MEAL AND 1 WITH A SNACK CAPSULE, DELAYED RELEASE PELLETS ORAL DAILY
Qty: 250 | Refills: 1 | Status: ACTIVE | COMMUNITY
Start: 2023-04-24

## 2023-07-20 RX ORDER — BUDESONIDE AND FORMOTEROL FUMARATE DIHYDRATE 160; 4.5 UG/1; UG/1
2 PUFFS AEROSOL RESPIRATORY (INHALATION) TWICE A DAY
Status: ACTIVE | COMMUNITY

## 2023-07-20 RX ORDER — HYDROCODONE BITARTRATE AND ACETAMINOPHEN 7.5; 325 MG/1; MG/1
1 TABLET AS NEEDED TABLET ORAL
Status: ACTIVE | COMMUNITY

## 2023-07-20 RX ORDER — ESOMEPRAZOLE MAGNESIUM 20 MG/1
1 CAPSULE CAPSULE, DELAYED RELEASE ORAL TWICE A DAY
Qty: 60 CAPSULE | Refills: 5 | Status: ACTIVE | COMMUNITY
Start: 2023-03-28

## 2023-07-20 RX ORDER — ESOMEPRAZOLE MAGNESIUM 20 MG/1
TAKE 1 CAPSULE BY MOUTH TWICE DAILY FOR 30 DAYS CAPSULE, DELAYED RELEASE ORAL
Qty: 60 CAPSULE | Refills: 0 | Status: ACTIVE | COMMUNITY

## 2023-07-20 RX ORDER — METHOCARBAMOL 500 MG/1
1.5 TABLETS TABLET ORAL
Status: ACTIVE | COMMUNITY

## 2023-07-20 RX ORDER — RIZATRIPTAN BENZOATE 10 MG/1
1 TABLET AS NEEDED TABLET ORAL ONCE A DAY
Status: ACTIVE | COMMUNITY

## 2023-07-20 RX ORDER — ONDANSETRON HYDROCHLORIDE 4 MG/1
1 TABLET AS NEEDED TABLET, FILM COATED ORAL ONCE A DAY
Status: ACTIVE | COMMUNITY
Start: 2020-05-27

## 2023-07-20 RX ORDER — BUSPIRONE HYDROCHLORIDE 10 MG/1
1 TABLET TABLET ORAL TWICE A DAY
Status: ACTIVE | COMMUNITY

## 2023-09-26 ENCOUNTER — OFFICE VISIT (OUTPATIENT)
Dept: URBAN - METROPOLITAN AREA CLINIC 48 | Facility: CLINIC | Age: 63
End: 2023-09-26

## 2023-10-12 ENCOUNTER — OFFICE VISIT (OUTPATIENT)
Dept: URBAN - METROPOLITAN AREA CLINIC 48 | Facility: CLINIC | Age: 63
End: 2023-10-12
Payer: COMMERCIAL

## 2023-10-12 VITALS
DIASTOLIC BLOOD PRESSURE: 68 MMHG | HEIGHT: 60 IN | HEART RATE: 83 BPM | WEIGHT: 187 LBS | BODY MASS INDEX: 36.71 KG/M2 | TEMPERATURE: 97.5 F | SYSTOLIC BLOOD PRESSURE: 108 MMHG

## 2023-10-12 DIAGNOSIS — R11.0 NAUSEA: ICD-10-CM

## 2023-10-12 DIAGNOSIS — R93.3 ABNORMAL DIGESTIVE SYSTEM DIAGNOSTIC IMAGING: ICD-10-CM

## 2023-10-12 DIAGNOSIS — R19.4 CHANGE IN BOWEL HABIT: ICD-10-CM

## 2023-10-12 DIAGNOSIS — R10.32 LLQ ABDOMINAL PAIN: ICD-10-CM

## 2023-10-12 DIAGNOSIS — K21.9 GERD: ICD-10-CM

## 2023-10-12 DIAGNOSIS — K44.9 HIATAL HERNIA: ICD-10-CM

## 2023-10-12 DIAGNOSIS — R14.0 BLOATING SYMPTOM: ICD-10-CM

## 2023-10-12 PROCEDURE — 99213 OFFICE O/P EST LOW 20 MIN: CPT | Performed by: NURSE PRACTITIONER

## 2023-10-12 RX ORDER — MONTELUKAST 10 MG/1
1 TABLET AT NOGHT TABLET, FILM COATED ORAL ONCE A DAY
Status: ACTIVE | COMMUNITY

## 2023-10-12 RX ORDER — ALBUTEROL SULFATE 108 UG/1
1 PUFF AS NEEDED INHALANT RESPIRATORY (INHALATION)
Refills: 1 | Status: ACTIVE | COMMUNITY

## 2023-10-12 RX ORDER — HYDROCODONE BITARTRATE AND ACETAMINOPHEN 10; 325 MG/1; MG/1
1 TABLET AS NEEDED TABLET ORAL
Refills: 0 | Status: ACTIVE | COMMUNITY

## 2023-10-12 RX ORDER — BUDESONIDE AND FORMOTEROL FUMARATE DIHYDRATE 160; 4.5 UG/1; UG/1
2 PUFFS AEROSOL RESPIRATORY (INHALATION) TWICE A DAY
Status: ACTIVE | COMMUNITY

## 2023-10-12 RX ORDER — IPRATROPIUM BROMIDE 21 UG/1
2 SPRAYS IN EACH NOSTRIL SPRAY, METERED NASAL TWICE A DAY
Refills: 5 | Status: ACTIVE | COMMUNITY

## 2023-10-12 RX ORDER — AZELASTINE HYDROCHLORIDE 137 UG/1
1 PUFF IN EACH NOSTRIL SPRAY, METERED NASAL TWICE A DAY
Status: ACTIVE | COMMUNITY

## 2023-10-12 RX ORDER — LINACLOTIDE 145 UG/1
1 CAPSULE AT LEAST 30 MINUTES BEFORE THE FIRST MEAL OF THE DAY ON AN EMPTY STOMACH CAPSULE, GELATIN COATED ORAL ONCE A DAY
Status: ACTIVE | COMMUNITY

## 2023-10-12 RX ORDER — ROSUVASTATIN CALCIUM 20 MG/1
1 TABLET TABLET, COATED ORAL ONCE A DAY
Status: ACTIVE | COMMUNITY

## 2023-10-12 RX ORDER — TRAZODONE HYDROCHLORIDE 300 MG/1
1 TABLET AT BEDTIME AS NEEDED TABLET ORAL ONCE A DAY
Status: ACTIVE | COMMUNITY

## 2023-10-12 RX ORDER — ONDANSETRON HYDROCHLORIDE 4 MG/1
1 TABLET AS NEEDED TABLET, FILM COATED ORAL ONCE A DAY
Status: ACTIVE | COMMUNITY
Start: 2020-05-27

## 2023-10-12 RX ORDER — DOCUSATE SODIUM 100 MG/1
1 CAPSULE AS NEEDED CAPSULE, LIQUID FILLED ORAL ONCE A DAY
Status: ACTIVE | COMMUNITY

## 2023-10-12 RX ORDER — METHOCARBAMOL 500 MG/1
1.5 TABLETS TABLET ORAL
Status: ACTIVE | COMMUNITY

## 2023-10-12 RX ORDER — BUSPIRONE HYDROCHLORIDE 10 MG/1
1 TABLET TABLET ORAL TWICE A DAY
Status: ACTIVE | COMMUNITY

## 2023-10-12 RX ORDER — FLUTICASONE PROPIONATE 50 UG/1
1 SPRAY IN EACH NOSTRIL SPRAY, METERED NASAL ONCE A DAY
Status: ACTIVE | COMMUNITY

## 2023-10-12 RX ORDER — HYDROCHLOROTHIAZIDE 12.5 MG/1
1 TABLET IN THE MORNING CAPSULE ORAL ONCE A DAY
Status: ACTIVE | COMMUNITY

## 2023-10-12 RX ORDER — TIOTROPIUM BROMIDE INHALATION SPRAY 1.56 UG/1
2 PUFFS SPRAY, METERED RESPIRATORY (INHALATION) ONCE A DAY
Status: ACTIVE | COMMUNITY

## 2023-10-12 RX ORDER — ESOMEPRAZOLE MAGNESIUM 20 MG/1
1 CAPSULE CAPSULE, DELAYED RELEASE ORAL ONCE A DAY
Qty: 30 CAPSULE | Refills: 0 | Status: ACTIVE | COMMUNITY

## 2023-10-12 RX ORDER — RIZATRIPTAN BENZOATE 10 MG/1
1 TABLET AS NEEDED TABLET ORAL ONCE A DAY
Status: ACTIVE | COMMUNITY

## 2023-10-12 RX ORDER — CYCLOBENZAPRINE HYDROCHLORIDE 10 MG/1
1 TABLET AT BEDTIME AS NEEDED TABLET, FILM COATED ORAL ONCE A DAY
Status: ACTIVE | COMMUNITY

## 2023-10-12 RX ORDER — DOCUSATE SODIUM 50 MG AND SENNOSIDES 8.6 MG 8.6; 5 MG/1; MG/1
2 TABLET IN THE EVENING AS NEEDED TABLET, FILM COATED ORAL ONCE A DAY
Status: ACTIVE | COMMUNITY

## 2023-10-12 RX ORDER — BUPROPION HYDROCHLORIDE 300 MG/1
1 TABLET IN THE MORNING TABLET, EXTENDED RELEASE ORAL ONCE A DAY
Status: ACTIVE | COMMUNITY

## 2023-10-12 RX ORDER — LIDOCAINE 50 MG/G
USE 1 NEW PATCH EXTERNALLY ONCE DAILY. REMOVE AFTER 12 HOURS PATCH TOPICAL
Qty: 30 EACH | Refills: 5 | Status: ACTIVE | COMMUNITY

## 2023-10-12 NOTE — HPI-TODAY'S VISIT:
63 year old female presents for follow up. Seen previously for GERD, bloating, and altered bowel habits. The patient  has intermittent episodes of bloating that she feels worsened after having COVID. She often relates GI issues to diet (beef allergy). The patient has seen an allergist. Xifaxan samples have helped some previously. She has a hx of diverticulitis in 2013 s/p partial colectomy.  The patient was hospitalized in 3/2022 for possible partial sbo with pain and nausea/vomiting. A surgeon was consulted and she was told she did not need surgery. CT showed small bowel edema. Small bowel follow through 3/2022 was negative for obstruction and showed liquid stool in colon.  Last EGD/colon in 10/2021 showed a hiatal hernia, tubular adenoma, and internal hemorrhoids. ENT scheduled Famotidine 40mg prn. She sees ENT for sleep apnea and asthma. EGD and Bravo 5/2020 which was positive and the patient saw Dr. Amato for consultation for lap nissen.  Today presents for follow up. 10/12/23. She had total knee replacements in 4/2023 and 8/2023. The patient had COVID again in 7/2023. After that she had increased abdominal pain. Currently, she continues narcotics and has had constipation. She uses Linzess 145mcg prn but has started taking Cascara and continues flax seed oil and stool softener. Fiber causes more constipation and increases bloating. GERD has been well controlled on Esomeprazole 20mg but supplements in the morning with an additional dose. Recently, she has had more early satiety. The patient went to ER 8/12/23 for abdominal pain. WBC was 17. CT showed mild wall thickening of the splenic flexure, stool burden, but no bowel obstruction.

## 2023-11-13 ENCOUNTER — WEB ENCOUNTER (OUTPATIENT)
Dept: URBAN - METROPOLITAN AREA CLINIC 48 | Facility: CLINIC | Age: 63
End: 2023-11-13

## 2023-11-13 RX ORDER — CIPROFLOXACIN HYDROCHLORIDE 500 MG/1
1 TABLET TABLET, FILM COATED ORAL
Qty: 28 TABLET | Refills: 0 | OUTPATIENT
Start: 2023-11-14 | End: 2023-11-28

## 2023-11-13 RX ORDER — METRONIDAZOLE 500 MG/1
1 TABLET TABLET ORAL THREE TIMES A DAY
Qty: 42 TABLET | Refills: 0 | OUTPATIENT
Start: 2023-11-14 | End: 2023-11-28

## 2023-11-14 ENCOUNTER — WEB ENCOUNTER (OUTPATIENT)
Dept: URBAN - METROPOLITAN AREA CLINIC 48 | Facility: CLINIC | Age: 63
End: 2023-11-14

## 2024-02-12 ENCOUNTER — OV EP (OUTPATIENT)
Dept: URBAN - METROPOLITAN AREA CLINIC 48 | Facility: CLINIC | Age: 64
End: 2024-02-12
Payer: MEDICARE

## 2024-02-12 VITALS
SYSTOLIC BLOOD PRESSURE: 103 MMHG | HEART RATE: 77 BPM | BODY MASS INDEX: 37.38 KG/M2 | TEMPERATURE: 97.9 F | HEIGHT: 60 IN | DIASTOLIC BLOOD PRESSURE: 60 MMHG | WEIGHT: 190.4 LBS

## 2024-02-12 DIAGNOSIS — R14.0 BLOATING SYMPTOM: ICD-10-CM

## 2024-02-12 DIAGNOSIS — R11.0 NAUSEA: ICD-10-CM

## 2024-02-12 DIAGNOSIS — K21.9 GERD: ICD-10-CM

## 2024-02-12 DIAGNOSIS — R19.4 CHANGE IN BOWEL HABIT: ICD-10-CM

## 2024-02-12 PROBLEM — 413681009: Status: ACTIVE | Noted: 2024-02-12

## 2024-02-12 PROCEDURE — 99213 OFFICE O/P EST LOW 20 MIN: CPT | Performed by: NURSE PRACTITIONER

## 2024-02-12 RX ORDER — ESOMEPRAZOLE MAGNESIUM 20 MG/1
1 CAPSULE CAPSULE, DELAYED RELEASE ORAL ONCE A DAY
Qty: 30 CAPSULE | Refills: 0 | Status: ACTIVE | COMMUNITY

## 2024-02-12 RX ORDER — MONTELUKAST 10 MG/1
1 TABLET AT NOGHT TABLET, FILM COATED ORAL ONCE A DAY
Status: ACTIVE | COMMUNITY

## 2024-02-12 RX ORDER — ONDANSETRON HYDROCHLORIDE 4 MG/1
1 TABLET AS NEEDED TABLET, FILM COATED ORAL ONCE A DAY
Status: ACTIVE | COMMUNITY

## 2024-02-12 RX ORDER — BUSPIRONE HYDROCHLORIDE 10 MG/1
1 TABLET TABLET ORAL TWICE A DAY
Status: ACTIVE | COMMUNITY

## 2024-02-12 RX ORDER — LIDOCAINE 50 MG/G
USE 1 NEW PATCH EXTERNALLY ONCE DAILY. REMOVE AFTER 12 HOURS PATCH TOPICAL
Qty: 30 EACH | Refills: 5 | Status: ACTIVE | COMMUNITY

## 2024-02-12 RX ORDER — HYDROCHLOROTHIAZIDE 12.5 MG/1
1 TABLET IN THE MORNING CAPSULE ORAL ONCE A DAY
Status: ACTIVE | COMMUNITY

## 2024-02-12 RX ORDER — ROSUVASTATIN CALCIUM 20 MG/1
1 TABLET TABLET, COATED ORAL ONCE A DAY
Status: ACTIVE | COMMUNITY

## 2024-02-12 RX ORDER — ALBUTEROL SULFATE 108 UG/1
1 PUFF AS NEEDED INHALANT RESPIRATORY (INHALATION)
Refills: 1 | Status: ACTIVE | COMMUNITY

## 2024-02-12 RX ORDER — TRAZODONE HYDROCHLORIDE 300 MG/1
1 TABLET AT BEDTIME AS NEEDED TABLET ORAL ONCE A DAY
Status: ACTIVE | COMMUNITY

## 2024-02-12 RX ORDER — BUPROPION HYDROCHLORIDE 300 MG/1
1 TABLET IN THE MORNING TABLET, EXTENDED RELEASE ORAL ONCE A DAY
Status: ACTIVE | COMMUNITY

## 2024-02-12 RX ORDER — FLUTICASONE PROPIONATE 50 UG/1
1 SPRAY IN EACH NOSTRIL SPRAY, METERED NASAL ONCE A DAY
Status: ACTIVE | COMMUNITY

## 2024-02-12 RX ORDER — ONDANSETRON 4 MG/1
1 TABLET ON THE TONGUE AND ALLOW TO DISSOLVE TABLET, ORALLY DISINTEGRATING ORAL ONCE A DAY
Qty: 30 | OUTPATIENT
Start: 2024-02-12

## 2024-02-12 RX ORDER — METHOCARBAMOL 500 MG/1
1.5 TABLETS TABLET ORAL
Status: ACTIVE | COMMUNITY

## 2024-02-12 RX ORDER — AZELASTINE HYDROCHLORIDE 137 UG/1
1 PUFF IN EACH NOSTRIL SPRAY, METERED NASAL TWICE A DAY
Status: ACTIVE | COMMUNITY

## 2024-02-12 RX ORDER — LINACLOTIDE 145 UG/1
1 CAPSULE AT LEAST 30 MINUTES BEFORE THE FIRST MEAL OF THE DAY ON AN EMPTY STOMACH CAPSULE, GELATIN COATED ORAL ONCE A DAY
Status: ACTIVE | COMMUNITY

## 2024-02-12 RX ORDER — CYCLOBENZAPRINE HYDROCHLORIDE 10 MG/1
1 TABLET AT BEDTIME AS NEEDED TABLET, FILM COATED ORAL ONCE A DAY
Status: ACTIVE | COMMUNITY

## 2024-02-12 RX ORDER — RIZATRIPTAN BENZOATE 10 MG/1
1 TABLET AS NEEDED TABLET ORAL ONCE A DAY
Status: ACTIVE | COMMUNITY

## 2024-02-12 RX ORDER — FLUTICASONE FUROATE, UMECLIDINIUM BROMIDE AND VILANTEROL TRIFENATATE 200; 62.5; 25 UG/1; UG/1; UG/1
1 PUFF POWDER RESPIRATORY (INHALATION) ONCE A DAY
Status: ACTIVE | COMMUNITY

## 2024-02-12 RX ORDER — ESOMEPRAZOLE MAGNESIUM 40 MG/1
1 CAPSULE CAPSULE, DELAYED RELEASE ORAL ONCE A DAY
Qty: 30 | OUTPATIENT
Start: 2024-02-12

## 2024-02-12 RX ORDER — IPRATROPIUM BROMIDE 21 UG/1
2 SPRAYS IN EACH NOSTRIL SPRAY, METERED NASAL TWICE A DAY
Refills: 5 | Status: ACTIVE | COMMUNITY

## 2024-02-12 RX ORDER — DOCUSATE SODIUM 50 MG AND SENNOSIDES 8.6 MG 8.6; 5 MG/1; MG/1
2 TABLET IN THE EVENING AS NEEDED TABLET, FILM COATED ORAL ONCE A DAY
Status: ACTIVE | COMMUNITY

## 2024-02-12 NOTE — HPI-TODAY'S VISIT:
63 year old female presents for follow up. Seen previously for GERD, bloating, and altered bowel habits. The patient  has intermittent episodes of bloating that she feels worsened after having COVID. She often relates GI issues to diet (beef allergy). The patient has seen an allergist. Xifaxan samples have helped in the past. She has a hx of diverticulitis in 2013 s/p partial colectomy.  The patient was hospitalized in 3/2022 for possible partial sbo with pain and nausea/vomiting. A surgeon was consulted and she was told she did not need surgery. CT showed small bowel edema. Small bowel follow through 3/2022 was negative for obstruction and showed liquid stool in colon.  Last EGD/colon in 10/2021 showed a hiatal hernia, tubular adenoma, and internal hemorrhoids. ENT scheduled Famotidine 40mg prn. She sees ENT for sleep apnea and asthma. EGD and Bravo 5/2020 which was positive and the patient saw Dr. Amato for consultation for lap nissen.  Today presents for follow up. GERD, nausea, and hoarseness have worsened. In January, she had the flu. She does mention needing adjustments to her CPAP mask. GERD was controlled on Esomeprazole 20mg but it has worsened.  Borborgymi and esophageal spasms are occurring frequently. Probiotics did not help her.  Bowel habits have been managed with Cascara and flax seed oil. Fiber causes more constipation and increases bloating. She states her abdomen does not "feel right" and has concerns scar tissue from prior surgeries contribute.

## 2024-03-14 ENCOUNTER — LAB (OUTPATIENT)
Dept: URBAN - METROPOLITAN AREA CLINIC 48 | Facility: CLINIC | Age: 64
End: 2024-03-14

## 2024-03-14 PROBLEM — 21101000119105: Status: ACTIVE | Noted: 2024-03-14

## 2024-03-18 ENCOUNTER — EGD (OUTPATIENT)
Dept: URBAN - METROPOLITAN AREA SURGERY CENTER 28 | Facility: SURGERY CENTER | Age: 64
End: 2024-03-18
Payer: COMMERCIAL

## 2024-03-18 ENCOUNTER — LAB (OUTPATIENT)
Dept: URBAN - METROPOLITAN AREA CLINIC 4 | Facility: CLINIC | Age: 64
End: 2024-03-18
Payer: COMMERCIAL

## 2024-03-18 DIAGNOSIS — R13.19 CERVICAL DYSPHAGIA: ICD-10-CM

## 2024-03-18 DIAGNOSIS — K21.9 GASTRO-ESOPHAGEAL REFLUX DISEASE WITHOUT ESOPHAGITIS: ICD-10-CM

## 2024-03-18 DIAGNOSIS — K21.9 ACID REFLUX: ICD-10-CM

## 2024-03-18 PROCEDURE — 88312 SPECIAL STAINS GROUP 1: CPT | Performed by: PATHOLOGY

## 2024-03-18 PROCEDURE — 88305 TISSUE EXAM BY PATHOLOGIST: CPT | Performed by: PATHOLOGY

## 2024-03-18 PROCEDURE — 43248 EGD GUIDE WIRE INSERTION: CPT | Performed by: INTERNAL MEDICINE

## 2024-03-18 RX ORDER — RIZATRIPTAN BENZOATE 10 MG/1
1 TABLET AS NEEDED TABLET ORAL ONCE A DAY
Status: ACTIVE | COMMUNITY

## 2024-03-18 RX ORDER — MONTELUKAST 10 MG/1
1 TABLET AT NOGHT TABLET, FILM COATED ORAL ONCE A DAY
Status: ACTIVE | COMMUNITY

## 2024-03-18 RX ORDER — AZELASTINE HYDROCHLORIDE 137 UG/1
1 PUFF IN EACH NOSTRIL SPRAY, METERED NASAL TWICE A DAY
Status: ACTIVE | COMMUNITY

## 2024-03-18 RX ORDER — ONDANSETRON 4 MG/1
1 TABLET ON THE TONGUE AND ALLOW TO DISSOLVE TABLET, ORALLY DISINTEGRATING ORAL ONCE A DAY
Qty: 30 | Status: ACTIVE | COMMUNITY
Start: 2024-02-12

## 2024-03-18 RX ORDER — ALBUTEROL SULFATE 108 UG/1
1 PUFF AS NEEDED INHALANT RESPIRATORY (INHALATION)
Refills: 1 | Status: ACTIVE | COMMUNITY

## 2024-03-18 RX ORDER — FLUTICASONE FUROATE, UMECLIDINIUM BROMIDE AND VILANTEROL TRIFENATATE 200; 62.5; 25 UG/1; UG/1; UG/1
1 PUFF POWDER RESPIRATORY (INHALATION) ONCE A DAY
Status: ACTIVE | COMMUNITY

## 2024-03-18 RX ORDER — ESOMEPRAZOLE MAGNESIUM 20 MG/1
1 CAPSULE CAPSULE, DELAYED RELEASE ORAL ONCE A DAY
Qty: 30 CAPSULE | Refills: 0 | Status: ACTIVE | COMMUNITY

## 2024-03-18 RX ORDER — FLUTICASONE PROPIONATE 50 UG/1
1 SPRAY IN EACH NOSTRIL SPRAY, METERED NASAL ONCE A DAY
Status: ACTIVE | COMMUNITY

## 2024-03-18 RX ORDER — BUPROPION HYDROCHLORIDE 300 MG/1
1 TABLET IN THE MORNING TABLET, EXTENDED RELEASE ORAL ONCE A DAY
Status: ACTIVE | COMMUNITY

## 2024-03-18 RX ORDER — LIDOCAINE 50 MG/G
USE 1 NEW PATCH EXTERNALLY ONCE DAILY. REMOVE AFTER 12 HOURS PATCH TOPICAL
Qty: 30 EACH | Refills: 5 | Status: ACTIVE | COMMUNITY

## 2024-03-18 RX ORDER — DOCUSATE SODIUM 50 MG AND SENNOSIDES 8.6 MG 8.6; 5 MG/1; MG/1
2 TABLET IN THE EVENING AS NEEDED TABLET, FILM COATED ORAL ONCE A DAY
Status: ACTIVE | COMMUNITY

## 2024-03-18 RX ORDER — ONDANSETRON HYDROCHLORIDE 4 MG/1
1 TABLET AS NEEDED TABLET, FILM COATED ORAL ONCE A DAY
Status: ACTIVE | COMMUNITY

## 2024-03-18 RX ORDER — IPRATROPIUM BROMIDE 21 UG/1
2 SPRAYS IN EACH NOSTRIL SPRAY, METERED NASAL TWICE A DAY
Refills: 5 | Status: ACTIVE | COMMUNITY

## 2024-03-18 RX ORDER — HYDROCHLOROTHIAZIDE 12.5 MG/1
1 TABLET IN THE MORNING CAPSULE ORAL ONCE A DAY
Status: ACTIVE | COMMUNITY

## 2024-03-18 RX ORDER — LINACLOTIDE 145 UG/1
1 CAPSULE AT LEAST 30 MINUTES BEFORE THE FIRST MEAL OF THE DAY ON AN EMPTY STOMACH CAPSULE, GELATIN COATED ORAL ONCE A DAY
Status: ACTIVE | COMMUNITY

## 2024-03-18 RX ORDER — CYCLOBENZAPRINE HYDROCHLORIDE 10 MG/1
1 TABLET AT BEDTIME AS NEEDED TABLET, FILM COATED ORAL ONCE A DAY
Status: ACTIVE | COMMUNITY

## 2024-03-18 RX ORDER — METHOCARBAMOL 500 MG/1
1.5 TABLETS TABLET ORAL
Status: ACTIVE | COMMUNITY

## 2024-03-18 RX ORDER — ROSUVASTATIN CALCIUM 20 MG/1
1 TABLET TABLET, COATED ORAL ONCE A DAY
Status: ACTIVE | COMMUNITY

## 2024-03-18 RX ORDER — TRAZODONE HYDROCHLORIDE 300 MG/1
1 TABLET AT BEDTIME AS NEEDED TABLET ORAL ONCE A DAY
Status: ACTIVE | COMMUNITY

## 2024-03-18 RX ORDER — BUSPIRONE HYDROCHLORIDE 10 MG/1
1 TABLET TABLET ORAL TWICE A DAY
Status: ACTIVE | COMMUNITY

## 2024-03-18 RX ORDER — ESOMEPRAZOLE MAGNESIUM 40 MG/1
1 CAPSULE CAPSULE, DELAYED RELEASE ORAL ONCE A DAY
Qty: 30 | Status: ACTIVE | COMMUNITY
Start: 2024-02-12

## 2024-05-07 ENCOUNTER — OFFICE VISIT (OUTPATIENT)
Dept: URBAN - METROPOLITAN AREA CLINIC 48 | Facility: CLINIC | Age: 64
End: 2024-05-07
Payer: COMMERCIAL

## 2024-05-07 ENCOUNTER — LAB OUTSIDE AN ENCOUNTER (OUTPATIENT)
Dept: URBAN - METROPOLITAN AREA CLINIC 48 | Facility: CLINIC | Age: 64
End: 2024-05-07

## 2024-05-07 ENCOUNTER — DASHBOARD ENCOUNTERS (OUTPATIENT)
Age: 64
End: 2024-05-07

## 2024-05-07 VITALS
BODY MASS INDEX: 38.09 KG/M2 | HEART RATE: 78 BPM | TEMPERATURE: 97.9 F | WEIGHT: 194 LBS | DIASTOLIC BLOOD PRESSURE: 64 MMHG | HEIGHT: 60 IN | SYSTOLIC BLOOD PRESSURE: 124 MMHG

## 2024-05-07 DIAGNOSIS — K21.9 GERD: ICD-10-CM

## 2024-05-07 DIAGNOSIS — R11.0 NAUSEA: ICD-10-CM

## 2024-05-07 DIAGNOSIS — R14.0 BLOATING SYMPTOM: ICD-10-CM

## 2024-05-07 DIAGNOSIS — R19.4 CHANGE IN BOWEL HABIT: ICD-10-CM

## 2024-05-07 PROBLEM — 301716002: Status: ACTIVE | Noted: 2024-05-07

## 2024-05-07 PROBLEM — 90458007: Status: ACTIVE | Noted: 2024-05-07

## 2024-05-07 PROBLEM — 301717006: Status: ACTIVE | Noted: 2024-05-07

## 2024-05-07 PROCEDURE — 99214 OFFICE O/P EST MOD 30 MIN: CPT | Performed by: NURSE PRACTITIONER

## 2024-05-07 RX ORDER — LIDOCAINE 50 MG/G
USE 1 NEW PATCH EXTERNALLY ONCE DAILY. REMOVE AFTER 12 HOURS PATCH TOPICAL
Qty: 30 EACH | Refills: 5 | Status: ACTIVE | COMMUNITY

## 2024-05-07 RX ORDER — METHOCARBAMOL 500 MG/1
1.5 TABLETS TABLET ORAL
Status: ACTIVE | COMMUNITY

## 2024-05-07 RX ORDER — AZELASTINE HYDROCHLORIDE 137 UG/1
1 PUFF IN EACH NOSTRIL SPRAY, METERED NASAL TWICE A DAY
Status: ACTIVE | COMMUNITY

## 2024-05-07 RX ORDER — MONTELUKAST 10 MG/1
1 TABLET AT NOGHT TABLET, FILM COATED ORAL ONCE A DAY
Status: ACTIVE | COMMUNITY

## 2024-05-07 RX ORDER — RIZATRIPTAN BENZOATE 10 MG/1
1 TABLET AS NEEDED TABLET ORAL ONCE A DAY
Status: ACTIVE | COMMUNITY

## 2024-05-07 RX ORDER — ONDANSETRON 4 MG/1
1 TABLET ON THE TONGUE AND ALLOW TO DISSOLVE TABLET, ORALLY DISINTEGRATING ORAL ONCE A DAY
Qty: 30 | Status: ACTIVE | COMMUNITY
Start: 2024-02-12

## 2024-05-07 RX ORDER — BUSPIRONE HYDROCHLORIDE 10 MG/1
1 TABLET TABLET ORAL TWICE A DAY
Status: ACTIVE | COMMUNITY

## 2024-05-07 RX ORDER — IPRATROPIUM BROMIDE 21 UG/1
2 SPRAYS IN EACH NOSTRIL SPRAY, METERED NASAL TWICE A DAY
Refills: 5 | Status: ACTIVE | COMMUNITY

## 2024-05-07 RX ORDER — CYCLOBENZAPRINE HYDROCHLORIDE 10 MG/1
1 TABLET AT BEDTIME AS NEEDED TABLET, FILM COATED ORAL ONCE A DAY
Status: ACTIVE | COMMUNITY

## 2024-05-07 RX ORDER — ONDANSETRON 4 MG/1
1 TABLET ON THE TONGUE AND ALLOW TO DISSOLVE TABLET, ORALLY DISINTEGRATING ORAL ONCE A DAY
Qty: 30 | Refills: 1 | OUTPATIENT
Start: 2024-05-07

## 2024-05-07 RX ORDER — FAMOTIDINE 40 MG/1
1 TABLET ONE HOUR BEFORE BREAKFAST TABLET, FILM COATED ORAL ONCE A DAY
Qty: 90 TABLET | Refills: 3 | Status: ACTIVE | COMMUNITY
Start: 2024-03-18

## 2024-05-07 RX ORDER — TRAZODONE HYDROCHLORIDE 300 MG/1
1 TABLET AT BEDTIME AS NEEDED TABLET ORAL ONCE A DAY
Status: ACTIVE | COMMUNITY

## 2024-05-07 RX ORDER — DOCUSATE SODIUM 50 MG AND SENNOSIDES 8.6 MG 8.6; 5 MG/1; MG/1
2 TABLET IN THE EVENING AS NEEDED TABLET, FILM COATED ORAL ONCE A DAY
Status: ACTIVE | COMMUNITY

## 2024-05-07 RX ORDER — HYDROCHLOROTHIAZIDE 12.5 MG/1
1 TABLET IN THE MORNING CAPSULE ORAL ONCE A DAY
Status: ACTIVE | COMMUNITY

## 2024-05-07 RX ORDER — ALBUTEROL SULFATE 108 UG/1
1 PUFF AS NEEDED INHALANT RESPIRATORY (INHALATION)
Refills: 1 | Status: ACTIVE | COMMUNITY

## 2024-05-07 RX ORDER — ESOMEPRAZOLE MAGNESIUM 20 MG/1
1 CAPSULE CAPSULE, DELAYED RELEASE ORAL ONCE A DAY
Qty: 30 CAPSULE | Refills: 0 | Status: ACTIVE | COMMUNITY

## 2024-05-07 RX ORDER — ASCORBIC ACID 1000 MG
AS DIRECTED TABLET ORAL
Status: ACTIVE | COMMUNITY

## 2024-05-07 RX ORDER — ONDANSETRON HYDROCHLORIDE 4 MG/1
1 TABLET AS NEEDED TABLET, FILM COATED ORAL ONCE A DAY
Status: ACTIVE | COMMUNITY

## 2024-05-07 RX ORDER — LINACLOTIDE 145 UG/1
1 CAPSULE AT LEAST 30 MINUTES BEFORE THE FIRST MEAL OF THE DAY ON AN EMPTY STOMACH CAPSULE, GELATIN COATED ORAL ONCE A DAY
Status: ACTIVE | COMMUNITY

## 2024-05-07 RX ORDER — FLUTICASONE PROPIONATE 50 UG/1
1 SPRAY IN EACH NOSTRIL SPRAY, METERED NASAL ONCE A DAY
Status: ACTIVE | COMMUNITY

## 2024-05-07 RX ORDER — ROSUVASTATIN CALCIUM 20 MG/1
1 TABLET TABLET, COATED ORAL ONCE A DAY
Status: ACTIVE | COMMUNITY

## 2024-05-07 RX ORDER — HYDROCORTISONE 25 MG/G
1 APPLICATION CREAM TOPICAL TWICE A DAY
Qty: 1 | Refills: 1 | OUTPATIENT
Start: 2024-05-07 | End: 2024-05-13

## 2024-05-07 RX ORDER — BUPROPION HYDROCHLORIDE 300 MG/1
1 TABLET IN THE MORNING TABLET, EXTENDED RELEASE ORAL ONCE A DAY
Status: ACTIVE | COMMUNITY

## 2024-05-07 RX ORDER — FLUTICASONE FUROATE, UMECLIDINIUM BROMIDE AND VILANTEROL TRIFENATATE 200; 62.5; 25 UG/1; UG/1; UG/1
1 PUFF POWDER RESPIRATORY (INHALATION) ONCE A DAY
Status: ACTIVE | COMMUNITY

## 2024-05-10 ENCOUNTER — WEB ENCOUNTER (OUTPATIENT)
Dept: URBAN - METROPOLITAN AREA CLINIC 48 | Facility: CLINIC | Age: 64
End: 2024-05-10

## 2024-05-18 ENCOUNTER — WEB ENCOUNTER (OUTPATIENT)
Dept: URBAN - METROPOLITAN AREA CLINIC 48 | Facility: CLINIC | Age: 64
End: 2024-05-18

## 2024-05-23 ENCOUNTER — WEB ENCOUNTER (OUTPATIENT)
Dept: URBAN - METROPOLITAN AREA CLINIC 48 | Facility: CLINIC | Age: 64
End: 2024-05-23

## 2024-05-28 ENCOUNTER — TELEPHONE ENCOUNTER (OUTPATIENT)
Dept: URBAN - METROPOLITAN AREA CLINIC 48 | Facility: CLINIC | Age: 64
End: 2024-05-28

## 2024-06-03 ENCOUNTER — WEB ENCOUNTER (OUTPATIENT)
Dept: URBAN - METROPOLITAN AREA CLINIC 48 | Facility: CLINIC | Age: 64
End: 2024-06-03

## 2024-08-07 ENCOUNTER — OFFICE VISIT (OUTPATIENT)
Dept: URBAN - METROPOLITAN AREA CLINIC 48 | Facility: CLINIC | Age: 64
End: 2024-08-07
Payer: MEDICARE

## 2024-08-07 VITALS
HEIGHT: 60 IN | TEMPERATURE: 97.9 F | HEART RATE: 81 BPM | BODY MASS INDEX: 37.26 KG/M2 | SYSTOLIC BLOOD PRESSURE: 121 MMHG | WEIGHT: 189.8 LBS | DIASTOLIC BLOOD PRESSURE: 74 MMHG

## 2024-08-07 DIAGNOSIS — R10.32 LLQ PAIN: ICD-10-CM

## 2024-08-07 DIAGNOSIS — R11.0 NAUSEA: ICD-10-CM

## 2024-08-07 DIAGNOSIS — K21.9 GERD: ICD-10-CM

## 2024-08-07 DIAGNOSIS — R10.11 RUQ ABDOMINAL PAIN: ICD-10-CM

## 2024-08-07 PROCEDURE — 99213 OFFICE O/P EST LOW 20 MIN: CPT | Performed by: NURSE PRACTITIONER

## 2024-08-07 RX ORDER — FAMOTIDINE 40 MG/1
1 TABLET ONE HOUR BEFORE BREAKFAST TABLET, FILM COATED ORAL ONCE A DAY
Qty: 90 TABLET | Refills: 3 | Status: ACTIVE | COMMUNITY
Start: 2024-03-18

## 2024-08-07 RX ORDER — FLUTICASONE FUROATE, UMECLIDINIUM BROMIDE AND VILANTEROL TRIFENATATE 200; 62.5; 25 UG/1; UG/1; UG/1
1 PUFF POWDER RESPIRATORY (INHALATION) ONCE A DAY
Status: ACTIVE | COMMUNITY

## 2024-08-07 RX ORDER — IPRATROPIUM BROMIDE 21 UG/1
2 SPRAYS IN EACH NOSTRIL SPRAY, METERED NASAL TWICE A DAY
Refills: 5 | Status: ACTIVE | COMMUNITY

## 2024-08-07 RX ORDER — MELATONIN 3 MG
1 CAPSULE AT BEDTIME AS NEEDED CAPSULE ORAL ONCE A DAY
Status: ACTIVE | COMMUNITY

## 2024-08-07 RX ORDER — ESOMEPRAZOLE MAGNESIUM 20 MG/1
1 CAPSULE CAPSULE, DELAYED RELEASE ORAL ONCE A DAY
Qty: 30 CAPSULE | Refills: 0 | Status: ACTIVE | COMMUNITY

## 2024-08-07 RX ORDER — ASCORBIC ACID 1000 MG
AS DIRECTED TABLET ORAL
Status: ACTIVE | COMMUNITY

## 2024-08-07 RX ORDER — METHOCARBAMOL 500 MG/1
1.5 TABLETS TABLET ORAL
Status: ACTIVE | COMMUNITY

## 2024-08-07 RX ORDER — RIZATRIPTAN BENZOATE 10 MG/1
1 TABLET AS NEEDED TABLET ORAL ONCE A DAY
Status: ACTIVE | COMMUNITY

## 2024-08-07 RX ORDER — ALBUTEROL SULFATE 108 UG/1
1 PUFF AS NEEDED INHALANT RESPIRATORY (INHALATION)
Refills: 1 | Status: ACTIVE | COMMUNITY

## 2024-08-07 RX ORDER — HYDROCHLOROTHIAZIDE 12.5 MG/1
1 TABLET IN THE MORNING CAPSULE ORAL ONCE A DAY
Status: ACTIVE | COMMUNITY

## 2024-08-07 RX ORDER — BIOTIN 10000 MCG
AS DIRECTED CAPSULE ORAL
Status: ACTIVE | COMMUNITY

## 2024-08-07 RX ORDER — LIDOCAINE 50 MG/G
1 PATCH REMOVE AFTER 12 HOURS PATCH TOPICAL ONCE A DAY
Refills: 5 | Status: ACTIVE | COMMUNITY

## 2024-08-07 RX ORDER — AZELASTINE HYDROCHLORIDE 137 UG/1
1 PUFF IN EACH NOSTRIL SPRAY, METERED NASAL TWICE A DAY
Status: ACTIVE | COMMUNITY

## 2024-08-07 RX ORDER — LINACLOTIDE 145 UG/1
1 CAPSULE AT LEAST 30 MINUTES BEFORE THE FIRST MEAL OF THE DAY ON AN EMPTY STOMACH CAPSULE, GELATIN COATED ORAL ONCE A DAY
Status: ACTIVE | COMMUNITY

## 2024-08-07 RX ORDER — TRAZODONE HYDROCHLORIDE 300 MG/1
1 TABLET AT BEDTIME AS NEEDED TABLET ORAL ONCE A DAY
Status: ACTIVE | COMMUNITY

## 2024-08-07 RX ORDER — FLUTICASONE PROPIONATE 50 UG/1
1 SPRAY IN EACH NOSTRIL SPRAY, METERED NASAL ONCE A DAY
Status: ACTIVE | COMMUNITY

## 2024-08-07 RX ORDER — MONTELUKAST 10 MG/1
1 TABLET AT NOGHT TABLET, FILM COATED ORAL ONCE A DAY
Status: ACTIVE | COMMUNITY

## 2024-08-07 RX ORDER — DOCUSATE SODIUM 50 MG AND SENNOSIDES 8.6 MG 8.6; 5 MG/1; MG/1
2 TABLET IN THE EVENING AS NEEDED TABLET, FILM COATED ORAL ONCE A DAY
Status: ACTIVE | COMMUNITY

## 2024-08-07 RX ORDER — ROSUVASTATIN CALCIUM 20 MG/1
1 TABLET TABLET, COATED ORAL ONCE A DAY
Status: ACTIVE | COMMUNITY

## 2024-08-07 RX ORDER — TEZEPELUMAB-EKKO 210 MG/1.9ML
AS DIRECTED INJECTION, SOLUTION SUBCUTANEOUS
Status: ACTIVE | COMMUNITY

## 2024-08-07 RX ORDER — BUSPIRONE HYDROCHLORIDE 10 MG/1
1 TABLET TABLET ORAL TWICE A DAY
Status: ACTIVE | COMMUNITY

## 2024-08-07 RX ORDER — ONDANSETRON 4 MG/1
1 TABLET ON THE TONGUE AND ALLOW TO DISSOLVE TABLET, ORALLY DISINTEGRATING ORAL ONCE A DAY
Qty: 30 | Status: ACTIVE | COMMUNITY
Start: 2024-02-12

## 2024-08-07 RX ORDER — CYCLOBENZAPRINE HYDROCHLORIDE 10 MG/1
1 TABLET AT BEDTIME AS NEEDED TABLET, FILM COATED ORAL ONCE A DAY
Status: ACTIVE | COMMUNITY

## 2024-08-07 NOTE — HPI-TODAY'S VISIT:
64 year old female presents for follow up of bloating and abdominal pain. Seen previously for GERD, bloating, and altered bowel habits. The patient  has intermittent episodes of bloating. The patient is on chronic pain medications TID. Gastric empty study in 2019 was normal. She often relates GI issues to diet (beef allergy). The patient has seen an allergist. Xifaxan samples have helped in the past in the past. She has a hx of diverticulitis in 2013 s/p partial colectomy. Abdominal pain is in the LLQ and RUQ and she feels like she has bowel inflammation. 8/2023 CT showed colitis but no diverticulitis. Small bowel follow through in 2022 was normal. Last colon in 10/2021 showed a hiatal hernia, tubular adenoma, and internal hemorrhoids. Bowel habits have been managed with Cascara and flax seed oil. Fiber causes more constipation and increases bloating. She states her abdomen does not "feel right" and has concerns scar tissue from prior surgeries contribute. In the past, she has taken courses of empiric Cipro and Flagyl for diverticulitis versus colitis.   The patient was hospitalized in 3/2022 for possible partial sbo with pain and nausea/vomiting. A surgeon was consulted and she was told she did not need surgery. CT showed small bowel edema. Small bowel follow through 3/2022 was negative for obstruction and showed liquid stool in colon.  GERD has been fairly well controlled on Esomeprazole. She sees ENT for sleep apnea and asthma. EGD and Bravo 5/2020 which was positive and the patient saw Dr. Amato for consultation for lap nissen. She feels like she has esophageal spasms but cannot take DIcyclomine due to constipation.  Today presents for follow up. GERD has been well controlled on Esomeprazole 20mg at night and Famotidine 20mg in the morning. Currently she is still having morning nausea that resolves on its own. She is on Simbiotic and bloating has improved. The patient is seeing Dr. Serna this week for thyroid nodules. Bowel movements have been most days taking Cascara.

## 2025-01-16 ENCOUNTER — WEB ENCOUNTER (OUTPATIENT)
Dept: URBAN - METROPOLITAN AREA CLINIC 48 | Facility: CLINIC | Age: 65
End: 2025-01-16

## 2025-01-16 RX ORDER — AMOXICILLIN AND CLAVULANATE POTASSIUM 500; 125 MG/1; 1/1
1 TABLET TABLET, FILM COATED ORAL
Qty: 20 TABLET | Refills: 0 | OUTPATIENT
Start: 2025-01-17 | End: 2025-01-27

## 2025-01-17 ENCOUNTER — WEB ENCOUNTER (OUTPATIENT)
Dept: URBAN - METROPOLITAN AREA CLINIC 48 | Facility: CLINIC | Age: 65
End: 2025-01-17

## 2025-02-11 ENCOUNTER — LAB OUTSIDE AN ENCOUNTER (OUTPATIENT)
Dept: URBAN - METROPOLITAN AREA CLINIC 48 | Facility: CLINIC | Age: 65
End: 2025-02-11

## 2025-02-11 ENCOUNTER — OFFICE VISIT (OUTPATIENT)
Dept: URBAN - METROPOLITAN AREA CLINIC 48 | Facility: CLINIC | Age: 65
End: 2025-02-11
Payer: MEDICARE

## 2025-02-11 VITALS
TEMPERATURE: 97.9 F | BODY MASS INDEX: 39.19 KG/M2 | HEART RATE: 82 BPM | WEIGHT: 199.6 LBS | HEIGHT: 60 IN | DIASTOLIC BLOOD PRESSURE: 74 MMHG | SYSTOLIC BLOOD PRESSURE: 132 MMHG

## 2025-02-11 DIAGNOSIS — R14.0 ABDOMINAL BLOATING: ICD-10-CM

## 2025-02-11 DIAGNOSIS — K21.9 GERD: ICD-10-CM

## 2025-02-11 DIAGNOSIS — R10.32 LLQ PAIN: ICD-10-CM

## 2025-02-11 DIAGNOSIS — K57.90 DIVERTICULOSIS: ICD-10-CM

## 2025-02-11 DIAGNOSIS — R19.4 CHANGE IN BOWEL HABITS: ICD-10-CM

## 2025-02-11 PROBLEM — 88111009: Status: ACTIVE | Noted: 2025-02-11

## 2025-02-11 PROCEDURE — 99214 OFFICE O/P EST MOD 30 MIN: CPT | Performed by: NURSE PRACTITIONER

## 2025-02-11 RX ORDER — IPRATROPIUM BROMIDE 21 UG/1
2 SPRAYS IN EACH NOSTRIL SPRAY, METERED NASAL TWICE A DAY
Refills: 5 | Status: ACTIVE | COMMUNITY

## 2025-02-11 RX ORDER — FAMOTIDINE 40 MG/1
1 TABLET ONE HOUR BEFORE BREAKFAST TABLET, FILM COATED ORAL ONCE A DAY
Qty: 90 TABLET | Refills: 3 | Status: ACTIVE | COMMUNITY
Start: 2024-03-18

## 2025-02-11 RX ORDER — BIOTIN 10000 MCG
AS DIRECTED CAPSULE ORAL
Status: ACTIVE | COMMUNITY

## 2025-02-11 RX ORDER — TRAZODONE HYDROCHLORIDE 300 MG/1
1 TABLET AT BEDTIME AS NEEDED TABLET ORAL ONCE A DAY
Status: ACTIVE | COMMUNITY

## 2025-02-11 RX ORDER — METHOCARBAMOL 500 MG/1
1.5 TABLETS TABLET ORAL
Status: ACTIVE | COMMUNITY

## 2025-02-11 RX ORDER — BUSPIRONE HYDROCHLORIDE 10 MG/1
1 TABLET TABLET ORAL TWICE A DAY
Status: ACTIVE | COMMUNITY

## 2025-02-11 RX ORDER — MELATONIN 3 MG
1 CAPSULE AT BEDTIME AS NEEDED CAPSULE ORAL ONCE A DAY
Status: ACTIVE | COMMUNITY

## 2025-02-11 RX ORDER — LINACLOTIDE 145 UG/1
1 CAPSULE AT LEAST 30 MINUTES BEFORE THE FIRST MEAL OF THE DAY ON AN EMPTY STOMACH CAPSULE, GELATIN COATED ORAL ONCE A DAY
Status: ACTIVE | COMMUNITY

## 2025-02-11 RX ORDER — DOCUSATE SODIUM 50 MG AND SENNOSIDES 8.6 MG 8.6; 5 MG/1; MG/1
2 TABLET IN THE EVENING AS NEEDED TABLET, FILM COATED ORAL ONCE A DAY
Status: ACTIVE | COMMUNITY

## 2025-02-11 RX ORDER — AZELASTINE HYDROCHLORIDE 137 UG/1
1 PUFF IN EACH NOSTRIL SPRAY, METERED NASAL TWICE A DAY
Status: ACTIVE | COMMUNITY

## 2025-02-11 RX ORDER — HYDROCHLOROTHIAZIDE 12.5 MG/1
1 TABLET IN THE MORNING CAPSULE ORAL ONCE A DAY
Status: ACTIVE | COMMUNITY

## 2025-02-11 RX ORDER — ONDANSETRON 4 MG/1
1 TABLET ON THE TONGUE AND ALLOW TO DISSOLVE TABLET, ORALLY DISINTEGRATING ORAL ONCE A DAY
Qty: 30 | Status: ACTIVE | COMMUNITY
Start: 2024-02-12

## 2025-02-11 RX ORDER — RIZATRIPTAN BENZOATE 10 MG/1
1 TABLET AS NEEDED TABLET ORAL ONCE A DAY
Status: ACTIVE | COMMUNITY

## 2025-02-11 RX ORDER — ESOMEPRAZOLE MAGNESIUM 20 MG/1
1 CAPSULE CAPSULE, DELAYED RELEASE ORAL ONCE A DAY
Qty: 30 CAPSULE | Refills: 0 | Status: ACTIVE | COMMUNITY

## 2025-02-11 RX ORDER — ALBUTEROL SULFATE 108 UG/1
1 PUFF AS NEEDED INHALANT RESPIRATORY (INHALATION)
Refills: 1 | Status: ACTIVE | COMMUNITY

## 2025-02-11 RX ORDER — FLUTICASONE PROPIONATE 50 UG/1
1 SPRAY IN EACH NOSTRIL SPRAY, METERED NASAL ONCE A DAY
Status: ACTIVE | COMMUNITY

## 2025-02-11 RX ORDER — ASCORBIC ACID 1000 MG
AS DIRECTED TABLET ORAL
Status: ACTIVE | COMMUNITY

## 2025-02-11 RX ORDER — LIDOCAINE 50 MG/G
1 PATCH REMOVE AFTER 12 HOURS PATCH TOPICAL ONCE A DAY
Refills: 5 | Status: ACTIVE | COMMUNITY

## 2025-02-11 RX ORDER — CYCLOBENZAPRINE HYDROCHLORIDE 10 MG/1
1 TABLET AT BEDTIME AS NEEDED TABLET, FILM COATED ORAL ONCE A DAY
Status: ACTIVE | COMMUNITY

## 2025-02-11 RX ORDER — MONTELUKAST 10 MG/1
1 TABLET AT NOGHT TABLET, FILM COATED ORAL ONCE A DAY
Status: ACTIVE | COMMUNITY

## 2025-02-11 NOTE — HPI-TODAY'S VISIT:
64 year old female presents for follow up of bloating and abdominal pain. Seen previously for GERD, bloating, and altered bowel habits. The patient  has intermittent episodes of bloating. The patient is on chronic pain medications TID. Gastric empty study in 2019 was normal. She often relates GI issues to diet (beef allergy). The patient has seen an allergist. Xifaxan samples have helped in the past in the past. She has a hx of diverticulitis in 2013 s/p partial colectomy. Abdominal pain is in the LLQ and RUQ and she feels like she has bowel inflammation. 8/2023 CT showed colitis but no diverticulitis. Small bowel follow through in 2022 was normal. Last colon in 10/2021 showed a hiatal hernia, tubular adenoma, and internal hemorrhoids. Bowel habits have been managed with Cascara and flax seed oil. Fiber causes more constipation and increases bloating. She states her abdomen does not "feel right" and has concerns scar tissue from prior surgeries contribute. In the past, she has taken courses of empiric Cipro and Flagyl for diverticulitis versus colitis.   The patient was hospitalized in 3/2022 for possible partial sbo with pain and nausea/vomiting. A surgeon was consulted and she was told she did not need surgery. CT showed small bowel edema. Small bowel follow through 3/2022 was negative for obstruction and showed liquid stool in colon.  GERD has been fairly well controlled on Esomeprazole. She sees ENT for sleep apnea and asthma. EGD and Bravo 5/2020 which was positive and the patient saw Dr. Amato for consultation for lap nissen. She feels like she has esophageal spasms but cannot take DIcyclomine due to constipation.  Today presents for follow up. GERD has been well controlled on Esomeprazole 20mg at night and Famotidine 20mg in the morning. Currently she is still having morning nausea that resolves on its own. She is on Simbiotic and bloating has improved. The patient is seeing Dr. Serna this week for thyroid nodules. Bowel movements have been most days taking Cascara.  2/11/25: Presents today for evaluation of abdominal bloating and pain. The patient was started on Dexamethasone, inhalers for COVID early January. The patient is to see pulmonologist soon. She is having pedal edema and abdominal bloating that has worsened. Lower abdominal pain has worsened. Continues Cascara for bowel movements. GERD has not been very controlled on daily Esomeprazole.

## 2025-02-13 ENCOUNTER — WEB ENCOUNTER (OUTPATIENT)
Dept: URBAN - METROPOLITAN AREA CLINIC 48 | Facility: CLINIC | Age: 65
End: 2025-02-13

## 2025-02-13 LAB
CREATININE: 0.74
EGFR: 90

## 2025-02-21 ENCOUNTER — WEB ENCOUNTER (OUTPATIENT)
Dept: URBAN - METROPOLITAN AREA CLINIC 48 | Facility: CLINIC | Age: 65
End: 2025-02-21

## 2025-02-21 RX ORDER — VONOPRAZAN FUMARATE 26.72 MG/1
1 TABLET TABLET ORAL ONCE A DAY
Qty: 30 | Refills: 5 | OUTPATIENT
Start: 2025-02-25 | End: 2025-08-24

## 2025-03-05 ENCOUNTER — TELEPHONE ENCOUNTER (OUTPATIENT)
Dept: URBAN - METROPOLITAN AREA CLINIC 48 | Facility: CLINIC | Age: 65
End: 2025-03-05

## 2025-04-07 ENCOUNTER — OFFICE VISIT (OUTPATIENT)
Dept: URBAN - METROPOLITAN AREA CLINIC 48 | Facility: CLINIC | Age: 65
End: 2025-04-07
Payer: MEDICARE

## 2025-04-07 ENCOUNTER — LAB OUTSIDE AN ENCOUNTER (OUTPATIENT)
Dept: URBAN - METROPOLITAN AREA CLINIC 48 | Facility: CLINIC | Age: 65
End: 2025-04-07

## 2025-04-07 DIAGNOSIS — K76.0 HEPATIC STEATOSIS: ICD-10-CM

## 2025-04-07 DIAGNOSIS — R14.0 ABDOMINAL BLOATING: ICD-10-CM

## 2025-04-07 DIAGNOSIS — K21.9 GERD: ICD-10-CM

## 2025-04-07 DIAGNOSIS — R19.4 CHANGE IN BOWEL HABITS: ICD-10-CM

## 2025-04-07 DIAGNOSIS — R10.32 LLQ PAIN: ICD-10-CM

## 2025-04-07 DIAGNOSIS — K57.90 DIVERTICULOSIS: ICD-10-CM

## 2025-04-07 PROBLEM — 197321007: Status: ACTIVE | Noted: 2025-04-07

## 2025-04-07 PROCEDURE — 99214 OFFICE O/P EST MOD 30 MIN: CPT | Performed by: NURSE PRACTITIONER

## 2025-04-07 RX ORDER — LINACLOTIDE 145 UG/1
1 CAPSULE AT LEAST 30 MINUTES BEFORE THE FIRST MEAL OF THE DAY ON AN EMPTY STOMACH CAPSULE, GELATIN COATED ORAL ONCE A DAY
Status: ACTIVE | COMMUNITY

## 2025-04-07 RX ORDER — EVOLOCUMAB 140 MG/ML
1 ML INJECTION, SOLUTION SUBCUTANEOUS
Status: ACTIVE | COMMUNITY

## 2025-04-07 RX ORDER — METHOCARBAMOL 500 MG/1
1.5 TABLETS TABLET ORAL
Status: ACTIVE | COMMUNITY

## 2025-04-07 RX ORDER — ALBUTEROL SULFATE 108 UG/1
1 PUFF AS NEEDED INHALANT RESPIRATORY (INHALATION)
Refills: 1 | Status: ACTIVE | COMMUNITY

## 2025-04-07 RX ORDER — ONDANSETRON 4 MG/1
1 TABLET ON THE TONGUE AND ALLOW TO DISSOLVE TABLET, ORALLY DISINTEGRATING ORAL ONCE A DAY
Qty: 30 | Status: ACTIVE | COMMUNITY
Start: 2024-02-12

## 2025-04-07 RX ORDER — HYDROCHLOROTHIAZIDE 12.5 MG/1
1 TABLET IN THE MORNING CAPSULE ORAL ONCE A DAY
Status: ACTIVE | COMMUNITY

## 2025-04-07 RX ORDER — VONOPRAZAN FUMARATE 26.72 MG/1
1 TABLET TABLET ORAL ONCE A DAY
Qty: 30 | Refills: 6 | OUTPATIENT
Start: 2025-04-07 | End: 2025-11-03

## 2025-04-07 RX ORDER — BUSPIRONE HYDROCHLORIDE 10 MG/1
1 TABLET TABLET ORAL TWICE A DAY
Status: ACTIVE | COMMUNITY

## 2025-04-07 RX ORDER — MONTELUKAST 10 MG/1
1 TABLET AT NOGHT TABLET, FILM COATED ORAL ONCE A DAY
Status: ACTIVE | COMMUNITY

## 2025-04-07 RX ORDER — AZELASTINE HYDROCHLORIDE 137 UG/1
1 PUFF IN EACH NOSTRIL SPRAY, METERED NASAL TWICE A DAY
Status: ACTIVE | COMMUNITY

## 2025-04-07 RX ORDER — IPRATROPIUM BROMIDE 21 UG/1
2 SPRAYS IN EACH NOSTRIL SPRAY, METERED NASAL TWICE A DAY
Refills: 5 | Status: ACTIVE | COMMUNITY

## 2025-04-07 RX ORDER — TRAZODONE HYDROCHLORIDE 300 MG/1
1 TABLET AT BEDTIME AS NEEDED TABLET ORAL ONCE A DAY
Status: ACTIVE | COMMUNITY

## 2025-04-07 RX ORDER — DOCUSATE SODIUM 50 MG AND SENNOSIDES 8.6 MG 8.6; 5 MG/1; MG/1
2 TABLET IN THE EVENING AS NEEDED TABLET, FILM COATED ORAL ONCE A DAY
Status: ACTIVE | COMMUNITY

## 2025-04-07 RX ORDER — BIOTIN 10000 MCG
AS DIRECTED CAPSULE ORAL
Status: DISCONTINUED | COMMUNITY

## 2025-04-07 RX ORDER — CYCLOBENZAPRINE HYDROCHLORIDE 10 MG/1
1 TABLET AT BEDTIME AS NEEDED TABLET, FILM COATED ORAL ONCE A DAY
Status: ACTIVE | COMMUNITY

## 2025-04-07 RX ORDER — RIZATRIPTAN BENZOATE 10 MG/1
1 TABLET AS NEEDED TABLET ORAL ONCE A DAY
Status: ACTIVE | COMMUNITY

## 2025-04-07 RX ORDER — LIDOCAINE 50 MG/G
1 PATCH REMOVE AFTER 12 HOURS PATCH TOPICAL ONCE A DAY
Refills: 5 | Status: ACTIVE | COMMUNITY

## 2025-04-07 RX ORDER — FLUTICASONE PROPIONATE 50 UG/1
1 SPRAY IN EACH NOSTRIL SPRAY, METERED NASAL ONCE A DAY
Status: ACTIVE | COMMUNITY

## 2025-04-07 RX ORDER — ASCORBIC ACID 1000 MG
AS DIRECTED TABLET ORAL
Status: ACTIVE | COMMUNITY

## 2025-04-07 NOTE — PHYSICAL EXAM GASTROINTESTINAL
Abdomen , soft, mild epigastric and LUQ tenderness, nondistended , no guarding or rigidity , no masses palpable , normal bowel sounds , Liver and Spleen , no hepatomegaly present , no hepatosplenomegaly , liver nontender , spleen not palpable

## 2025-04-07 NOTE — HPI-TODAY'S VISIT:
65 year old female presents for follow up. Seen previously for GERD, bloating, and altered bowel habits. The patient  has intermittent episodes of bloating. The patient is on chronic pain medications TID. Gastric empty study in 2019 was normal. She often relates GI issues to diet (beef allergy). The patient has seen an allergist. Xifaxan samples have helped in the past in the past. She has a hx of diverticulitis in 2013 s/p partial colectomy. Abdominal pain is in the LLQ and RUQ and she feels like she has bowel inflammation. 8/2023 CT showed colitis but no diverticulitis. Small bowel follow through in 2022 was normal. Last colon in 10/2021 showed a hiatal hernia, tubular adenoma, and internal hemorrhoids. Bowel habits have been managed with Cascara and flax seed oil. Fiber causes more constipation and increases bloating. She states her abdomen does not "feel right" and has concerns scar tissue from prior surgeries contribute. In the past, she has taken courses of empiric Cipro and Flagyl for diverticulitis versus colitis.   The patient was hospitalized in 3/2022 for possible partial sbo with pain and nausea/vomiting. A surgeon was consulted and she was told she did not need surgery. CT showed small bowel edema. Small bowel follow through 3/2022 was negative for obstruction and showed liquid stool in colon. In 2/2025, she had abdominal pain that felt like diverticulitis. CT was negative.   GERD was not as well controlled and Esomeprazole was changed to Voquezna 20mg. The patient takes it at night and feels it helps more. She continues to have some nausea. Simbiotic seems to improve bloating.  She sees ENT for sleep apnea and asthma. EGD and Bravo 5/2020 which was positive and the patient saw Dr. Amato for consultation for nini nissen. She feels like she has esophageal spasms but cannot take DIcyclomine due to constipation.  Bowel movements occur most days taking Cascara.  Additionally, the patient has moderated hepatic steatosis. Recently, she was started on Repatha for hyperlipidemia.

## 2025-05-07 ENCOUNTER — WEB ENCOUNTER (OUTPATIENT)
Dept: URBAN - METROPOLITAN AREA CLINIC 48 | Facility: CLINIC | Age: 65
End: 2025-05-07

## 2025-05-07 RX ORDER — LINACLOTIDE 72 UG/1
1 CAPSULE AT LEAST 30 MINUTES BEFORE THE FIRST MEAL OF THE DAY ON AN EMPTY STOMACH CAPSULE, GELATIN COATED ORAL ONCE A DAY
Qty: 30 | Refills: 6 | OUTPATIENT
Start: 2025-05-13 | End: 2025-12-09

## 2025-05-14 ENCOUNTER — WEB ENCOUNTER (OUTPATIENT)
Dept: URBAN - METROPOLITAN AREA CLINIC 48 | Facility: CLINIC | Age: 65
End: 2025-05-14

## 2025-07-07 ENCOUNTER — WEB ENCOUNTER (OUTPATIENT)
Dept: URBAN - NONMETROPOLITAN AREA CLINIC 11 | Facility: CLINIC | Age: 65
End: 2025-07-07

## 2025-07-08 ENCOUNTER — WEB ENCOUNTER (OUTPATIENT)
Dept: URBAN - NONMETROPOLITAN AREA CLINIC 11 | Facility: CLINIC | Age: 65
End: 2025-07-08

## 2025-07-15 ENCOUNTER — ERX REFILL RESPONSE (OUTPATIENT)
Dept: URBAN - METROPOLITAN AREA CLINIC 44 | Facility: CLINIC | Age: 65
End: 2025-07-15

## 2025-07-15 RX ORDER — ONDANSETRON 4 MG/1
DISSOLVE 1 TABLET ON THE TONGUE ONCE DAILY FOR 30 DAYS TABLET, ORALLY DISINTEGRATING ORAL
Qty: 30 TABLET | Refills: 0

## 2025-07-16 ENCOUNTER — OFFICE VISIT (OUTPATIENT)
Dept: URBAN - NONMETROPOLITAN AREA CLINIC 11 | Facility: CLINIC | Age: 65
End: 2025-07-16

## 2025-07-16 ENCOUNTER — TELEPHONE ENCOUNTER (OUTPATIENT)
Dept: URBAN - METROPOLITAN AREA CLINIC 46 | Facility: CLINIC | Age: 65
End: 2025-07-16

## 2025-07-16 ENCOUNTER — TELEPHONE ENCOUNTER (OUTPATIENT)
Dept: URBAN - METROPOLITAN AREA CLINIC 48 | Facility: CLINIC | Age: 65
End: 2025-07-16

## 2025-07-16 RX ORDER — EVOLOCUMAB 140 MG/ML
1 ML INJECTION, SOLUTION SUBCUTANEOUS
Status: ACTIVE | COMMUNITY

## 2025-07-16 RX ORDER — CYCLOBENZAPRINE HYDROCHLORIDE 10 MG/1
1 TABLET AT BEDTIME AS NEEDED TABLET, FILM COATED ORAL ONCE A DAY
Status: ACTIVE | COMMUNITY

## 2025-07-16 RX ORDER — IPRATROPIUM BROMIDE 21 UG/1
2 SPRAYS IN EACH NOSTRIL SPRAY, METERED NASAL TWICE A DAY
Refills: 5 | Status: ACTIVE | COMMUNITY

## 2025-07-16 RX ORDER — AZELASTINE HYDROCHLORIDE 137 UG/1
1 PUFF IN EACH NOSTRIL SPRAY, METERED NASAL TWICE A DAY
Status: ACTIVE | COMMUNITY

## 2025-07-16 RX ORDER — ALBUTEROL SULFATE 108 UG/1
1 PUFF AS NEEDED INHALANT RESPIRATORY (INHALATION)
Refills: 1 | Status: ACTIVE | COMMUNITY

## 2025-07-16 RX ORDER — HYDROCHLOROTHIAZIDE 12.5 MG/1
1 TABLET IN THE MORNING CAPSULE ORAL ONCE A DAY
Status: ACTIVE | COMMUNITY

## 2025-07-16 RX ORDER — VONOPRAZAN FUMARATE 26.72 MG/1
1 TABLET TABLET ORAL ONCE A DAY
Qty: 30 | Refills: 6 | Status: ACTIVE | COMMUNITY
Start: 2025-04-07 | End: 2025-11-03

## 2025-07-16 RX ORDER — FLUTICASONE PROPIONATE 50 UG/1
1 SPRAY IN EACH NOSTRIL SPRAY, METERED NASAL ONCE A DAY
Status: ACTIVE | COMMUNITY

## 2025-07-16 RX ORDER — LINACLOTIDE 145 UG/1
1 CAPSULE AT LEAST 30 MINUTES BEFORE THE FIRST MEAL OF THE DAY ON AN EMPTY STOMACH CAPSULE, GELATIN COATED ORAL ONCE A DAY
Status: ACTIVE | COMMUNITY

## 2025-07-16 RX ORDER — MONTELUKAST 10 MG/1
1 TABLET AT NOGHT TABLET, FILM COATED ORAL ONCE A DAY
Status: ACTIVE | COMMUNITY

## 2025-07-16 RX ORDER — LINACLOTIDE 72 UG/1
1 CAPSULE AT LEAST 30 MINUTES BEFORE THE FIRST MEAL OF THE DAY ON AN EMPTY STOMACH CAPSULE, GELATIN COATED ORAL ONCE A DAY
Qty: 30 | Refills: 6 | Status: ACTIVE | COMMUNITY
Start: 2025-05-13 | End: 2025-12-09

## 2025-07-16 RX ORDER — HYDROCORTISONE 25 MG/G
1 APPLICATION CREAM TOPICAL TWICE A DAY
Qty: 5 | Refills: 1 | OUTPATIENT
Start: 2025-07-16 | End: 2025-07-22

## 2025-07-16 RX ORDER — ASCORBIC ACID 1000 MG
AS DIRECTED TABLET ORAL
Status: ACTIVE | COMMUNITY

## 2025-07-16 RX ORDER — DOCUSATE SODIUM 50 MG AND SENNOSIDES 8.6 MG 8.6; 5 MG/1; MG/1
2 TABLET IN THE EVENING AS NEEDED TABLET, FILM COATED ORAL ONCE A DAY
Status: ACTIVE | COMMUNITY

## 2025-07-16 RX ORDER — RIZATRIPTAN BENZOATE 10 MG/1
1 TABLET AS NEEDED TABLET ORAL ONCE A DAY
Status: ACTIVE | COMMUNITY

## 2025-07-16 RX ORDER — LIDOCAINE 50 MG/G
1 PATCH REMOVE AFTER 12 HOURS PATCH TOPICAL ONCE A DAY
Refills: 5 | Status: ACTIVE | COMMUNITY

## 2025-07-16 RX ORDER — BUSPIRONE HYDROCHLORIDE 10 MG/1
1 TABLET TABLET ORAL TWICE A DAY
Status: ACTIVE | COMMUNITY

## 2025-07-16 RX ORDER — ONDANSETRON 4 MG/1
1 TABLET ON THE TONGUE AND ALLOW TO DISSOLVE TABLET, ORALLY DISINTEGRATING ORAL ONCE A DAY
Qty: 30 | Status: ACTIVE | COMMUNITY
Start: 2024-02-12

## 2025-07-16 RX ORDER — TRAZODONE HYDROCHLORIDE 300 MG/1
1 TABLET AT BEDTIME AS NEEDED TABLET ORAL ONCE A DAY
Status: ACTIVE | COMMUNITY

## 2025-07-16 RX ORDER — METHOCARBAMOL 500 MG/1
1.5 TABLETS TABLET ORAL
Status: ACTIVE | COMMUNITY

## 2025-07-22 ENCOUNTER — OFFICE VISIT (OUTPATIENT)
Dept: URBAN - METROPOLITAN AREA TELEHEALTH 2 | Facility: TELEHEALTH | Age: 65
End: 2025-07-22
Payer: MEDICARE

## 2025-07-22 DIAGNOSIS — K76.0 HEPATIC STEATOSIS: ICD-10-CM

## 2025-07-22 DIAGNOSIS — K57.90 DIVERTICULOSIS: ICD-10-CM

## 2025-07-22 DIAGNOSIS — K21.9 GERD: ICD-10-CM

## 2025-07-22 DIAGNOSIS — K44.9 HIATAL HERNIA: ICD-10-CM

## 2025-07-22 DIAGNOSIS — R14.0 ABDOMINAL BLOATING: ICD-10-CM

## 2025-07-22 DIAGNOSIS — R19.4 CHANGE IN BOWEL HABITS: ICD-10-CM

## 2025-07-22 DIAGNOSIS — R10.32 LLQ PAIN: ICD-10-CM

## 2025-07-22 PROCEDURE — 99213 OFFICE O/P EST LOW 20 MIN: CPT | Performed by: NURSE PRACTITIONER

## 2025-07-22 RX ORDER — ALBUTEROL SULFATE 108 UG/1
1 PUFF AS NEEDED INHALANT RESPIRATORY (INHALATION)
Refills: 1 | Status: ACTIVE | COMMUNITY

## 2025-07-22 RX ORDER — MONTELUKAST 10 MG/1
1 TABLET AT NOGHT TABLET, FILM COATED ORAL ONCE A DAY
Status: ACTIVE | COMMUNITY

## 2025-07-22 RX ORDER — BUSPIRONE HYDROCHLORIDE 10 MG/1
1 TABLET TABLET ORAL TWICE A DAY
Status: ACTIVE | COMMUNITY

## 2025-07-22 RX ORDER — FLUTICASONE PROPIONATE 50 UG/1
1 SPRAY IN EACH NOSTRIL SPRAY, METERED NASAL ONCE A DAY
Status: ACTIVE | COMMUNITY

## 2025-07-22 RX ORDER — EVOLOCUMAB 140 MG/ML
1 ML INJECTION, SOLUTION SUBCUTANEOUS
Status: ACTIVE | COMMUNITY

## 2025-07-22 RX ORDER — VONOPRAZAN FUMARATE 26.72 MG/1
1 TABLET TABLET ORAL ONCE A DAY
Qty: 30 | Refills: 6 | Status: ACTIVE | COMMUNITY
Start: 2025-04-07 | End: 2025-11-03

## 2025-07-22 RX ORDER — DOCUSATE SODIUM 50 MG AND SENNOSIDES 8.6 MG 8.6; 5 MG/1; MG/1
2 TABLET IN THE EVENING AS NEEDED TABLET, FILM COATED ORAL ONCE A DAY
Status: ACTIVE | COMMUNITY

## 2025-07-22 RX ORDER — LIDOCAINE 50 MG/G
1 PATCH REMOVE AFTER 12 HOURS PATCH TOPICAL ONCE A DAY
Refills: 5 | Status: ACTIVE | COMMUNITY

## 2025-07-22 RX ORDER — LINACLOTIDE 72 UG/1
1 CAPSULE AT LEAST 30 MINUTES BEFORE THE FIRST MEAL OF THE DAY ON AN EMPTY STOMACH CAPSULE, GELATIN COATED ORAL ONCE A DAY
Qty: 30 | Refills: 6 | Status: ACTIVE | COMMUNITY
Start: 2025-05-13 | End: 2025-12-09

## 2025-07-22 RX ORDER — ONDANSETRON 4 MG/1
1 TABLET ON THE TONGUE AND ALLOW TO DISSOLVE TABLET, ORALLY DISINTEGRATING ORAL ONCE A DAY
Qty: 30 | Status: ACTIVE | COMMUNITY
Start: 2024-02-12

## 2025-07-22 RX ORDER — HYDROCHLOROTHIAZIDE 12.5 MG/1
1 TABLET IN THE MORNING CAPSULE ORAL ONCE A DAY
Status: ACTIVE | COMMUNITY

## 2025-07-22 RX ORDER — ASCORBIC ACID 1000 MG
AS DIRECTED TABLET ORAL
Status: ACTIVE | COMMUNITY

## 2025-07-22 RX ORDER — CYCLOBENZAPRINE HYDROCHLORIDE 10 MG/1
1 TABLET AT BEDTIME AS NEEDED TABLET, FILM COATED ORAL ONCE A DAY
Status: ACTIVE | COMMUNITY

## 2025-07-22 RX ORDER — IPRATROPIUM BROMIDE 21 UG/1
2 SPRAYS IN EACH NOSTRIL SPRAY, METERED NASAL TWICE A DAY
Refills: 5 | Status: ACTIVE | COMMUNITY

## 2025-07-22 RX ORDER — AZELASTINE HYDROCHLORIDE 137 UG/1
1 PUFF IN EACH NOSTRIL SPRAY, METERED NASAL TWICE A DAY
Status: ACTIVE | COMMUNITY

## 2025-07-22 RX ORDER — RIZATRIPTAN BENZOATE 10 MG/1
1 TABLET AS NEEDED TABLET ORAL ONCE A DAY
Status: ACTIVE | COMMUNITY

## 2025-07-22 RX ORDER — HYDROCORTISONE 25 MG/G
1 APPLICATION CREAM TOPICAL TWICE A DAY
Qty: 5 | Refills: 1 | Status: ACTIVE | COMMUNITY
Start: 2025-07-16 | End: 2025-07-22

## 2025-07-22 RX ORDER — LINACLOTIDE 145 UG/1
1 CAPSULE AT LEAST 30 MINUTES BEFORE THE FIRST MEAL OF THE DAY ON AN EMPTY STOMACH CAPSULE, GELATIN COATED ORAL ONCE A DAY
Status: ACTIVE | COMMUNITY

## 2025-07-22 RX ORDER — METHOCARBAMOL 500 MG/1
1.5 TABLETS TABLET ORAL
Status: ACTIVE | COMMUNITY

## 2025-07-22 RX ORDER — TRAZODONE HYDROCHLORIDE 300 MG/1
1 TABLET AT BEDTIME AS NEEDED TABLET ORAL ONCE A DAY
Status: ACTIVE | COMMUNITY

## 2025-07-22 NOTE — HPI-TODAY'S VISIT:
65 year old female presents via telehealth for follow up. Seen previously for GERD, bloating, and altered bowel habits. CTA 5/2025 was negative. Epigastric pain has improved. The patient recently started Ozempic and has lost 15lbs. Denies increase of bloating, nausea, or GERD. Currently, she is taking Esomeprazole most nights with good control. She feels Voquezna worked but increased her kidney function.  EGD and Bravo 5/2020 which was positive and the patient saw Dr. Amato for consultation for lap nissen. She feels like she has esophageal spasms but cannot take DIcyclomine due to constipation.  The patient is on chronic pain medications TID. Gastric empty study in 2019 was normal. She often relates GI issues to diet (beef allergy). The patient has seen an allergist. Xifaxan samples have helped in the past in the past.   Bowel movements are most days and at this time, she is only taking Linzes,  Cascara, or Senekot as needed. She has a hx of diverticulitis in 2013 s/p partial colectomy. In the last few years, she has been treated for diverticulitis a few times. The patient was hospitalized in 3/2022 for possible partial sbo with pain and nausea/vomiting. Last colon in 10/2021 showed a hiatal hernia, tubular adenoma, and internal hemorrhoids. The patient has concerns that scar tissue from prior surgeries contribute.  Additionally, the patient has moderated hepatic steatosis. Recently, she was started on Repatha for hyperlipidemia. Fibroscan 6/2025 resulted F0. 6/2025 ALT 48, AST 70, alkaline phosphatase 92, tbilirubin 1.4.